# Patient Record
Sex: FEMALE | Race: WHITE | Employment: FULL TIME | URBAN - METROPOLITAN AREA
[De-identification: names, ages, dates, MRNs, and addresses within clinical notes are randomized per-mention and may not be internally consistent; named-entity substitution may affect disease eponyms.]

---

## 2017-01-10 ENCOUNTER — OFFICE VISIT (OUTPATIENT)
Dept: INTERNAL MEDICINE CLINIC | Age: 57
End: 2017-01-10

## 2017-01-10 VITALS
HEIGHT: 67 IN | HEART RATE: 84 BPM | WEIGHT: 127 LBS | SYSTOLIC BLOOD PRESSURE: 96 MMHG | DIASTOLIC BLOOD PRESSURE: 54 MMHG | BODY MASS INDEX: 19.93 KG/M2

## 2017-01-10 DIAGNOSIS — Z71.84 COUNSELING FOR TRAVEL: Primary | ICD-10-CM

## 2017-01-10 PROCEDURE — 99212 OFFICE O/P EST SF 10 MIN: CPT | Performed by: INTERNAL MEDICINE

## 2017-01-10 RX ORDER — CIPROFLOXACIN 500 MG/1
TABLET, FILM COATED ORAL
Qty: 6 TABLET | Refills: 0 | Status: SHIPPED | OUTPATIENT
Start: 2017-01-10 | End: 2017-04-12 | Stop reason: ALTCHOICE

## 2017-04-12 ENCOUNTER — OFFICE VISIT (OUTPATIENT)
Dept: INTERNAL MEDICINE CLINIC | Age: 57
End: 2017-04-12

## 2017-04-12 VITALS
OXYGEN SATURATION: 100 % | BODY MASS INDEX: 20.41 KG/M2 | WEIGHT: 127 LBS | HEIGHT: 66 IN | DIASTOLIC BLOOD PRESSURE: 69 MMHG | HEART RATE: 72 BPM | SYSTOLIC BLOOD PRESSURE: 99 MMHG

## 2017-04-12 DIAGNOSIS — Z12.4 PAP SMEAR FOR CERVICAL CANCER SCREENING: ICD-10-CM

## 2017-04-12 DIAGNOSIS — N63.20 LEFT BREAST LUMP: ICD-10-CM

## 2017-04-12 DIAGNOSIS — Z00.00 ANNUAL PHYSICAL EXAM: ICD-10-CM

## 2017-04-12 DIAGNOSIS — Z11.4 SCREENING FOR HIV WITHOUT PRESENCE OF RISK FACTORS: ICD-10-CM

## 2017-04-12 DIAGNOSIS — Z11.59 NEED FOR HEPATITIS C SCREENING TEST: ICD-10-CM

## 2017-04-12 DIAGNOSIS — Z13.1 SCREENING FOR DIABETES MELLITUS: ICD-10-CM

## 2017-04-12 DIAGNOSIS — Z13.220 LIPID SCREENING: ICD-10-CM

## 2017-04-12 DIAGNOSIS — Z00.00 ANNUAL PHYSICAL EXAM: Primary | ICD-10-CM

## 2017-04-12 DIAGNOSIS — E55.9 VITAMIN D DEFICIENCY: ICD-10-CM

## 2017-04-12 DIAGNOSIS — N39.0 RECURRENT UTI: ICD-10-CM

## 2017-04-12 LAB
ANION GAP SERPL CALCULATED.3IONS-SCNC: 15 MMOL/L (ref 3–16)
BUN BLDV-MCNC: 8 MG/DL (ref 7–20)
CALCIUM SERPL-MCNC: 9.6 MG/DL (ref 8.3–10.6)
CHLORIDE BLD-SCNC: 101 MMOL/L (ref 99–110)
CHOLESTEROL, TOTAL: 212 MG/DL (ref 0–199)
CO2: 27 MMOL/L (ref 21–32)
CREAT SERPL-MCNC: 0.7 MG/DL (ref 0.6–1.1)
GFR AFRICAN AMERICAN: >60
GFR NON-AFRICAN AMERICAN: >60
GLUCOSE BLD-MCNC: 86 MG/DL (ref 70–99)
HDLC SERPL-MCNC: 83 MG/DL (ref 40–60)
HEPATITIS C ANTIBODY INTERPRETATION: NORMAL
LDL CHOLESTEROL CALCULATED: 111 MG/DL
POTASSIUM SERPL-SCNC: 4.8 MMOL/L (ref 3.5–5.1)
SODIUM BLD-SCNC: 143 MMOL/L (ref 136–145)
TRIGL SERPL-MCNC: 90 MG/DL (ref 0–150)
VITAMIN D 25-HYDROXY: 14.9 NG/ML
VLDLC SERPL CALC-MCNC: 18 MG/DL

## 2017-04-12 PROCEDURE — 99396 PREV VISIT EST AGE 40-64: CPT | Performed by: INTERNAL MEDICINE

## 2017-04-13 LAB — HIV-1 AND HIV-2 ANTIBODIES: NORMAL

## 2017-04-18 LAB
HPV COMMENT: NORMAL
HPV TYPE 16: NOT DETECTED
HPV TYPE 18: NOT DETECTED
HPVOH (OTHER TYPES): NOT DETECTED

## 2017-04-20 PROBLEM — E55.9 VITAMIN D DEFICIENCY: Status: ACTIVE | Noted: 2017-04-20

## 2017-04-20 RX ORDER — ERGOCALCIFEROL 1.25 MG/1
50000 CAPSULE ORAL WEEKLY
Qty: 13 CAPSULE | Refills: 0 | Status: SHIPPED | OUTPATIENT
Start: 2017-04-20 | End: 2018-01-16

## 2017-04-24 ENCOUNTER — HOSPITAL ENCOUNTER (OUTPATIENT)
Dept: ULTRASOUND IMAGING | Age: 57
Discharge: HOME | End: 2017-04-24
Attending: INTERNAL MEDICINE | Admitting: INTERNAL MEDICINE

## 2017-04-24 DIAGNOSIS — N63.20 LEFT BREAST LUMP: ICD-10-CM

## 2017-04-24 DIAGNOSIS — N63.0 BREAST LUMP: ICD-10-CM

## 2017-04-24 DIAGNOSIS — N63.0 BREAST LUMP OR MASS: ICD-10-CM

## 2017-05-01 DIAGNOSIS — N63.20 LEFT BREAST LUMP: Primary | ICD-10-CM

## 2017-05-16 ENCOUNTER — HOSPITAL ENCOUNTER (OUTPATIENT)
Dept: OTHER | Age: 57
Discharge: HOME | End: 2017-05-16
Attending: SURGERY | Admitting: SURGERY

## 2017-05-16 ENCOUNTER — OFFICE VISIT (OUTPATIENT)
Dept: SURGERY | Age: 57
End: 2017-05-16

## 2017-05-16 VITALS
DIASTOLIC BLOOD PRESSURE: 60 MMHG | SYSTOLIC BLOOD PRESSURE: 99 MMHG | WEIGHT: 125 LBS | HEART RATE: 79 BPM | HEIGHT: 66 IN | BODY MASS INDEX: 20.09 KG/M2

## 2017-05-16 DIAGNOSIS — N63.20 LEFT BREAST MASS: Primary | ICD-10-CM

## 2017-05-16 DIAGNOSIS — N63.20 LEFT BREAST MASS: ICD-10-CM

## 2017-05-16 PROCEDURE — 99244 OFF/OP CNSLTJ NEW/EST MOD 40: CPT | Performed by: SURGERY

## 2017-08-25 ENCOUNTER — OFFICE VISIT (OUTPATIENT)
Dept: INTERNAL MEDICINE CLINIC | Age: 57
End: 2017-08-25

## 2017-08-25 VITALS
OXYGEN SATURATION: 97 % | SYSTOLIC BLOOD PRESSURE: 89 MMHG | TEMPERATURE: 97.6 F | WEIGHT: 125 LBS | BODY MASS INDEX: 20.18 KG/M2 | HEART RATE: 86 BPM | DIASTOLIC BLOOD PRESSURE: 53 MMHG

## 2017-08-25 DIAGNOSIS — N39.0 RECURRENT UTI: Primary | ICD-10-CM

## 2017-08-25 LAB
BILIRUBIN, POC: NORMAL
BLOOD URINE, POC: NORMAL
CLARITY, POC: NORMAL
COLOR, POC: NORMAL
GLUCOSE URINE, POC: NORMAL
KETONES, POC: NORMAL
LEUKOCYTE EST, POC: NORMAL
NITRITE, POC: NEGATIVE
PH, POC: 6
PROTEIN, POC: NORMAL
SPECIFIC GRAVITY, POC: 1.01
UROBILINOGEN, POC: 0.2

## 2017-08-25 PROCEDURE — 81002 URINALYSIS NONAUTO W/O SCOPE: CPT | Performed by: INTERNAL MEDICINE

## 2017-08-25 PROCEDURE — 99213 OFFICE O/P EST LOW 20 MIN: CPT | Performed by: INTERNAL MEDICINE

## 2017-08-30 LAB — URINE CULTURE, ROUTINE: NORMAL

## 2017-09-04 ENCOUNTER — PATIENT MESSAGE (OUTPATIENT)
Dept: INTERNAL MEDICINE CLINIC | Age: 57
End: 2017-09-04

## 2017-09-04 DIAGNOSIS — R30.0 DYSURIA: Primary | ICD-10-CM

## 2017-09-07 LAB — URINE CULTURE, ROUTINE: NORMAL

## 2017-11-21 ENCOUNTER — HOSPITAL ENCOUNTER (OUTPATIENT)
Dept: MAMMOGRAPHY | Age: 57
Discharge: HOME | End: 2017-11-21
Admitting: SURGERY

## 2017-11-21 DIAGNOSIS — N63.20 LEFT BREAST MASS: ICD-10-CM

## 2017-12-06 ENCOUNTER — OFFICE VISIT (OUTPATIENT)
Dept: SURGERY | Age: 57
End: 2017-12-06

## 2017-12-06 ENCOUNTER — HOSPITAL ENCOUNTER (OUTPATIENT)
Dept: ULTRASOUND IMAGING | Age: 57
Discharge: HOME | End: 2017-12-06
Attending: SURGERY | Admitting: SURGERY

## 2017-12-06 VITALS
BODY MASS INDEX: 19.78 KG/M2 | HEART RATE: 70 BPM | SYSTOLIC BLOOD PRESSURE: 104 MMHG | HEIGHT: 67 IN | WEIGHT: 126 LBS | DIASTOLIC BLOOD PRESSURE: 69 MMHG

## 2017-12-06 DIAGNOSIS — N63.20 BREAST MASS, LEFT: ICD-10-CM

## 2017-12-06 DIAGNOSIS — N60.19 FIBROCYSTIC BREAST DISEASE, UNSPECIFIED LATERALITY: Primary | ICD-10-CM

## 2017-12-06 PROCEDURE — 99213 OFFICE O/P EST LOW 20 MIN: CPT | Performed by: SURGERY

## 2017-12-06 RX ORDER — TRIMETHOPRIM 100 MG/1
100 TABLET ORAL PRN
COMMUNITY
Start: 2017-10-09 | End: 2020-08-19

## 2017-12-12 ENCOUNTER — E-VISIT (OUTPATIENT)
Dept: INTERNAL MEDICINE CLINIC | Age: 57
End: 2017-12-12

## 2017-12-12 DIAGNOSIS — J01.00 ACUTE NON-RECURRENT MAXILLARY SINUSITIS: Primary | ICD-10-CM

## 2017-12-12 PROCEDURE — 99444 PR PHYSICIAN ONLINE EVALUATION & MANAGEMENT SERVICE: CPT | Performed by: INTERNAL MEDICINE

## 2017-12-12 RX ORDER — AMOXICILLIN AND CLAVULANATE POTASSIUM 875; 125 MG/1; MG/1
1 TABLET, FILM COATED ORAL 2 TIMES DAILY WITH MEALS
Qty: 20 TABLET | Refills: 0 | Status: SHIPPED | OUTPATIENT
Start: 2017-12-12 | End: 2017-12-22

## 2017-12-12 ASSESSMENT — LIFESTYLE VARIABLES: SMOKING_STATUS: NO

## 2017-12-14 NOTE — PROGRESS NOTES
HPI:as per patient provided history  Exam: N/A (electronic visit)  Assessment/Plan:  Diagnoses and all orders for this visit:    Acute non-recurrent maxillary sinusitis    Other orders  -     amoxicillin-clavulanate (AUGMENTIN) 875-125 MG per tablet;  Take 1 tablet by mouth 2 times daily (with meals) for 10 days

## 2018-01-16 ENCOUNTER — OFFICE VISIT (OUTPATIENT)
Dept: INTERNAL MEDICINE CLINIC | Age: 58
End: 2018-01-16

## 2018-01-16 VITALS
SYSTOLIC BLOOD PRESSURE: 88 MMHG | WEIGHT: 127 LBS | DIASTOLIC BLOOD PRESSURE: 52 MMHG | BODY MASS INDEX: 20.19 KG/M2 | HEART RATE: 76 BPM

## 2018-01-16 DIAGNOSIS — N30.90 RECURRENT CYSTITIS: Primary | ICD-10-CM

## 2018-01-16 DIAGNOSIS — E55.9 VITAMIN D DEFICIENCY: ICD-10-CM

## 2018-01-16 DIAGNOSIS — Z71.84 COUNSELING FOR TRAVEL: ICD-10-CM

## 2018-01-16 PROBLEM — N60.12 FIBROCYSTIC DISEASE OF LEFT BREAST: Status: ACTIVE | Noted: 2017-05-01

## 2018-01-16 PROCEDURE — 99213 OFFICE O/P EST LOW 20 MIN: CPT | Performed by: INTERNAL MEDICINE

## 2018-01-16 RX ORDER — CIPROFLOXACIN 500 MG/1
TABLET, FILM COATED ORAL
Qty: 6 TABLET | Refills: 0 | Status: SHIPPED | OUTPATIENT
Start: 2018-01-16 | End: 2019-01-28

## 2018-01-16 RX ORDER — MULTIVIT-MIN/IRON/FOLIC ACID/K 18-600-40
2000 CAPSULE ORAL DAILY
Qty: 30 CAPSULE | Refills: 11 | COMMUNITY
Start: 2018-01-16

## 2018-01-16 ASSESSMENT — PATIENT HEALTH QUESTIONNAIRE - PHQ9
2. FEELING DOWN, DEPRESSED OR HOPELESS: 0
1. LITTLE INTEREST OR PLEASURE IN DOING THINGS: 0
SUM OF ALL RESPONSES TO PHQ9 QUESTIONS 1 & 2: 0
SUM OF ALL RESPONSES TO PHQ QUESTIONS 1-9: 0

## 2018-01-16 NOTE — PROGRESS NOTES
[address] Internal Medicine  Patient Encounter   Ignacio Uribe MD    2018    Tiffanie Bernardo  :1960       Assessment/Plan:  Recurrent cystitis  Negative eval with Dr. Schuler Fitting per patient. . Will request CT and cysto. Will continue post-coital trimethoprim    Counseling for travel  [facility] work mission trip. Due for Tdap and oral typhoid rx given. Cipro rx for travel  No malaria prophylaxis required. Vitamin D deficiency  Now on oral vitamin D 2000 units daily. Labs with physical next time. Other orders  -     typhoid vaccine (VIVOTIF) delayed release capsule; Take 1 tablet by mouth every other day until gone. Should be taken 1 hour before meal with cold or lukewarm beverage.  -     ciprofloxacin (CIPRO) 500 MG tablet; Take 1 tablet bid x 3 days prn traveler's diarrhea. -     Cholecalciferol (VITAMIN D) 2000 units CAPS capsule; Take 2,000 Units by mouth daily  -     Tdap (ADACEL) 5-2-15.5 LF-MCG/0.5 injection; Inject 0.5 mLs into the muscle once for 1 dose Please call or fax office with date administered. Discussed medications with patient who voiced understanding of their use and indications. All questions answered. Return in about 4 months (around 2018) for CPE/PAP. (didn't have HPV with last pap)     Medical assistant triage:  Chief Complaint   Patient presents with    Check-Up     HPI:   Patient presents for follow-up of   1. Recurrent cystitis    2. Counseling for travel    3. Vitamin D deficiency         Getting ready to go to [facility]. Will be going in 2 weeks for 9 days, [place] altitude so no malaria med needed. 6th year going. Needs typhoid updated and wants to check on tetanus status. Doesn't need malaria med there due to altitude. Saw Dr. Schuler Fitting. Now on post-coital trimethoprim      ROS  As per HPI.     Past Medical History:   Diagnosis Date    Anxiety disorder     with panic attacks, Dr. Audrey Arnold    Cervical disc disease     MRI  with disc bulge C5-6    Internal hemorrhoids     Microhematuria 11/27/2013    Normal cysto/CT 2017, and patient reports negative 11/13.  Post-menopause     age 48    Recurrent cystitis 4/12/2017    Post-menopausal; Manage prophylactically with periurethral estrogen. Dr. Belle Schultz added trimethoprim post-coital 2017    Seasonal allergies     May-July    Sigmoid diverticulosis     scant, noted on colonoscopy 11/14    Vitamin D deficiency 4/20/2017     Prior to Visit Medications    Medication Sig Taking? Authorizing Provider   typhoid vaccine (VIVOTIF) delayed release capsule Take 1 tablet by mouth every other day until gone. Should be taken 1 hour before meal with cold or lukewarm beverage. Yes Lennox Highland, MD   ciprofloxacin (CIPRO) 500 MG tablet Take 1 tablet bid x 3 days prn traveler's diarrhea. Yes Lennox Highland, MD   Cholecalciferol (VITAMIN D) 2000 units CAPS capsule Take 2,000 Units by mouth daily Yes Lennox Highland, MD   Tdap (ADACEL) 5-2-15.5 LF-MCG/0.5 injection Inject 0.5 mLs into the muscle once for 1 dose Please call or fax office with date administered. Yes Lennox Highland, MD   trimethoprim (TRIMPEX) 100 MG tablet Take 100 mg by mouth as needed Yes Historical Provider, MD   MULTIPLE MINERALS PO Take by mouth Yes Historical Provider, MD   estradiol (ESTRACE VAGINAL) 0.1 MG/GM vaginal cream Apply pea-size amount to periurethral area daily for 2 weeks and then 2-3x/week. Yes Lennox Highland, MD   cetirizine (ZYRTEC) 10 MG tablet Take 10 mg by mouth daily as needed for Allergies. Yes Historical Provider, MD   PARoxetine (PAXIL) 40 MG tablet Take 40 mg by mouth every morning. Yes Historical Provider, MD   ALPRAZolam (XANAX XR) 1 MG XR tablet Take 1 mg by mouth nightly. Yes Historical Provider, MD   ALPRAZolam Dwain Flores) 0.5 MG tablet Take 0.5 mg by mouth as needed.  Yes Historical Provider, MD       OBJECTIVE:  BP Readings from Last 3 Encounters:   01/16/18 (!) 88/52   12/06/17 104/69   08/25/17 (!) 89/53      Wt Readings from Last 3

## 2019-01-29 ENCOUNTER — OFFICE VISIT (OUTPATIENT)
Dept: INTERNAL MEDICINE CLINIC | Age: 59
End: 2019-01-29
Payer: COMMERCIAL

## 2019-01-29 VITALS
HEART RATE: 78 BPM | DIASTOLIC BLOOD PRESSURE: 74 MMHG | WEIGHT: 124 LBS | BODY MASS INDEX: 19.93 KG/M2 | HEIGHT: 66 IN | OXYGEN SATURATION: 98 % | SYSTOLIC BLOOD PRESSURE: 114 MMHG

## 2019-01-29 DIAGNOSIS — Z00.00 ANNUAL PHYSICAL EXAM: Primary | ICD-10-CM

## 2019-01-29 DIAGNOSIS — Z12.39 SCREENING FOR BREAST CANCER: ICD-10-CM

## 2019-01-29 DIAGNOSIS — Z13.1 SCREENING FOR DIABETES MELLITUS: ICD-10-CM

## 2019-01-29 DIAGNOSIS — N60.12 FIBROCYSTIC DISEASE OF LEFT BREAST: ICD-10-CM

## 2019-01-29 DIAGNOSIS — Z78.0 POST-MENOPAUSAL: ICD-10-CM

## 2019-01-29 DIAGNOSIS — E55.9 VITAMIN D DEFICIENCY: ICD-10-CM

## 2019-01-29 DIAGNOSIS — Z13.220 LIPID SCREENING: ICD-10-CM

## 2019-01-29 PROCEDURE — G8484 FLU IMMUNIZE NO ADMIN: HCPCS | Performed by: INTERNAL MEDICINE

## 2019-01-29 PROCEDURE — 90715 TDAP VACCINE 7 YRS/> IM: CPT | Performed by: INTERNAL MEDICINE

## 2019-01-29 PROCEDURE — 99396 PREV VISIT EST AGE 40-64: CPT | Performed by: INTERNAL MEDICINE

## 2019-01-29 PROCEDURE — 90471 IMMUNIZATION ADMIN: CPT | Performed by: INTERNAL MEDICINE

## 2019-01-29 RX ORDER — CIPROFLOXACIN 500 MG/1
TABLET, FILM COATED ORAL
Qty: 6 TABLET | Refills: 0 | Status: SHIPPED | OUTPATIENT
Start: 2019-01-29 | End: 2020-01-29

## 2019-01-29 ASSESSMENT — PATIENT HEALTH QUESTIONNAIRE - PHQ9
SUM OF ALL RESPONSES TO PHQ QUESTIONS 1-9: 0
SUM OF ALL RESPONSES TO PHQ9 QUESTIONS 1 & 2: 0
SUM OF ALL RESPONSES TO PHQ QUESTIONS 1-9: 0
2. FEELING DOWN, DEPRESSED OR HOPELESS: 0
1. LITTLE INTEREST OR PLEASURE IN DOING THINGS: 0

## 2019-03-06 ENCOUNTER — HOSPITAL ENCOUNTER (OUTPATIENT)
Dept: MAMMOGRAPHY | Age: 59
Discharge: HOME | End: 2019-03-06
Payer: COMMERCIAL

## 2019-03-06 ENCOUNTER — HOSPITAL ENCOUNTER (OUTPATIENT)
Dept: GENERAL RADIOLOGY | Age: 59
Discharge: HOME | End: 2019-03-06
Payer: COMMERCIAL

## 2019-03-06 DIAGNOSIS — N60.12 FIBROCYSTIC DISEASE OF LEFT BREAST: ICD-10-CM

## 2019-03-06 DIAGNOSIS — Z78.0 POST-MENOPAUSAL: ICD-10-CM

## 2019-03-06 DIAGNOSIS — Z12.39 SCREENING FOR BREAST CANCER: ICD-10-CM

## 2019-03-06 PROCEDURE — 77080 DXA BONE DENSITY AXIAL: CPT

## 2019-03-06 PROCEDURE — 77063 BREAST TOMOSYNTHESIS BI: CPT

## 2019-03-07 PROBLEM — M85.89 OSTEOPENIA OF MULTIPLE SITES: Status: ACTIVE | Noted: 2019-03-07

## 2020-01-09 ENCOUNTER — E-VISIT (OUTPATIENT)
Dept: INTERNAL MEDICINE CLINIC | Age: 60
End: 2020-01-09

## 2020-01-09 PROCEDURE — 99421 OL DIG E/M SVC 5-10 MIN: CPT | Performed by: INTERNAL MEDICINE

## 2020-01-09 RX ORDER — AZITHROMYCIN 250 MG/1
500 TABLET, FILM COATED ORAL DAILY
Qty: 6 TABLET | Refills: 0 | Status: SHIPPED | OUTPATIENT
Start: 2020-01-09 | End: 2020-01-12

## 2020-01-29 ENCOUNTER — E-VISIT (OUTPATIENT)
Dept: INTERNAL MEDICINE CLINIC | Age: 60
End: 2020-01-29
Payer: COMMERCIAL

## 2020-01-29 PROCEDURE — 99441 PR PHYS/QHP TELEPHONE EVALUATION 5-10 MIN: CPT | Performed by: INTERNAL MEDICINE

## 2020-01-29 RX ORDER — AMOXICILLIN AND CLAVULANATE POTASSIUM 875; 125 MG/1; MG/1
1 TABLET, FILM COATED ORAL 2 TIMES DAILY
Qty: 14 TABLET | Refills: 0 | Status: SHIPPED | OUTPATIENT
Start: 2020-01-29 | End: 2020-02-05

## 2020-01-29 ASSESSMENT — LIFESTYLE VARIABLES
SMOKING_STATUS: NO, I'M A FORMER SMOKER
PACKS_PER_DAY: 1
SMOKING_YEARS: 20

## 2020-03-09 ENCOUNTER — HOSPITAL ENCOUNTER (OUTPATIENT)
Dept: MAMMOGRAPHY | Age: 60
Discharge: HOME | End: 2020-03-09
Payer: COMMERCIAL

## 2020-03-09 PROCEDURE — 77063 BREAST TOMOSYNTHESIS BI: CPT

## 2020-08-19 ENCOUNTER — OFFICE VISIT (OUTPATIENT)
Dept: INTERNAL MEDICINE CLINIC | Age: 60
End: 2020-08-19
Payer: COMMERCIAL

## 2020-08-19 VITALS
HEART RATE: 75 BPM | TEMPERATURE: 98 F | SYSTOLIC BLOOD PRESSURE: 98 MMHG | HEIGHT: 66 IN | BODY MASS INDEX: 20.57 KG/M2 | DIASTOLIC BLOOD PRESSURE: 60 MMHG | OXYGEN SATURATION: 97 % | WEIGHT: 128 LBS

## 2020-08-19 PROCEDURE — 99396 PREV VISIT EST AGE 40-64: CPT | Performed by: INTERNAL MEDICINE

## 2020-08-19 RX ORDER — NITROFURANTOIN MACROCRYSTALS 100 MG/1
100 CAPSULE ORAL NIGHTLY
COMMUNITY

## 2020-08-19 SDOH — HEALTH STABILITY: MENTAL HEALTH: HOW MANY STANDARD DRINKS CONTAINING ALCOHOL DO YOU HAVE ON A TYPICAL DAY?: 1 OR 2

## 2020-08-19 SDOH — HEALTH STABILITY: MENTAL HEALTH: HOW OFTEN DO YOU HAVE A DRINK CONTAINING ALCOHOL?: 2-3 TIMES A WEEK

## 2020-08-19 ASSESSMENT — PATIENT HEALTH QUESTIONNAIRE - PHQ9
SUM OF ALL RESPONSES TO PHQ9 QUESTIONS 1 & 2: 0
1. LITTLE INTEREST OR PLEASURE IN DOING THINGS: 0
SUM OF ALL RESPONSES TO PHQ QUESTIONS 1-9: 0
SUM OF ALL RESPONSES TO PHQ QUESTIONS 1-9: 0
2. FEELING DOWN, DEPRESSED OR HOPELESS: 0

## 2020-08-19 ASSESSMENT — ENCOUNTER SYMPTOMS
RESPIRATORY NEGATIVE: 1
GASTROINTESTINAL NEGATIVE: 1

## 2020-08-19 NOTE — PROGRESS NOTES
Annual Physical Exam      Neva Conroy  : 1960    Date of Service: 2020    Chief Complaint:Jessie Posada is a 61 y.o. female who presents for complete physical examination. HPI:  Had cysto with Dr. Dario Carter. Changed to macrodantin due to more uti and inflammation in bladder. Also added miralax. Exercise: walking 5 days/week, pilates when open  Diet: \"regular\"    No LMP recorded.  Patient is postmenopausal.   Sexual activity: single partner, contraception - post menopausal status  She has no breast pain or new or enlarging lumps on self exam, no vaginal bleeding, no hot flashes    Patient Care Team:  Sabino Guerrero MD as PCP - General (Internal Medicine)  Sabino Guerrero MD as PCP - [facility]  Pat Benoit MD as Consulting Physician (Gastroenterology)  Pee Neil MD as Consulting Physician (Urology)  ABEL Brooks - CNP as Advanced Practice Nurse (Urology)    Health Maintenance   Topic Date Due    Colon cancer screen colonoscopy  2019    Shingles Vaccine (1 of 2) 2021 (Originally 2010)    Breast cancer screen  2021    Cervical cancer screen  2022    Lipid screen  2024    DTaP/Tdap/Td vaccine (3 - Td) 2029    DEXA (modify frequency per FRAX score)  Completed    Hepatitis C screen  Completed    HIV screen  Completed    Hepatitis A vaccine  Aged Out    Hepatitis B vaccine  Aged Out    Hib vaccine  Aged Out    Meningococcal (ACWY) vaccine  Aged Out    Pneumococcal 0-64 years Vaccine  Aged [employer] History   Administered Date(s) Administered    Hepatitis A 2012, 2014    Polio IPV (IPOL) 2012    Tdap (Boostrix, Adacel) 2008, 2019    Typhoid Live, Oral (Vivotif) 2012, 2018       Allergies   Allergen Reactions    Influenza Vaccines Other (See Comments)     Fever and swollen axillary nodes       Outpatient Medications Marked as Taking for the 8/19/20 encounter (Office Visit) with Viet Riddle MD   Medication Sig Dispense Refill    nitrofurantoin (MACRODANTIN) 100 MG capsule Take 100 mg by mouth nightly      Polyethylene Glycol 3350 (MIRALAX PO) Take by mouth every morning      Cholecalciferol (VITAMIN D) 2000 units CAPS capsule Take 2,000 Units by mouth daily 30 capsule 11    estradiol (ESTRACE VAGINAL) 0.1 MG/GM vaginal cream Apply pea-size amount to periurethral area daily for 2 weeks and then 2-3x/week. 1 Tube 1    PARoxetine (PAXIL) 40 MG tablet Take 40 mg by mouth every morning.  ALPRAZolam (XANAX XR) 1 MG XR tablet Take 1 mg by mouth nightly. Past Medical History:   Diagnosis Date    Anxiety disorder     with panic attacks, Dr. Mandi Mclean Cervical disc disease     MRI 5/09 with disc bulge C5-6    Internal hemorrhoids     Microhematuria 11/27/2013    Normal cysto/CT 2017, and patient reports negative 11/13.  Osteopenia of multiple sites 3/7/2019    Post-menopause     age 48    Recurrent cystitis 4/12/2017    Post-menopausal; Manage prophylactically with periurethral estrogen. Dr. John Plascencia added trimethoprim post-coital 2017    Seasonal allergies     May-July    Sigmoid diverticulosis     scant, noted on colonoscopy 11/14    Vitamin D deficiency 4/20/2017     Past Surgical History:   Procedure Laterality Date    BREAST BIOPSY Right 10/2015    benign stroaml fiborsis and ductal hyperplasia.  No atypia    PELVIC LAPAROSCOPY  1990    infertility    WISDOM TOOTH EXTRACTION      18's     Family History   Problem Relation Age of Onset    Colon Cancer Father 58        Rectal     High Cholesterol Father     Colon Polyps Brother     Colon Polyps Sister     Thyroid Disease Mother     Atrial Fibrillation Mother         subsequent stroke    Other Other         colon polyps    Coronary Art Dis Other 79        MI (\"anuerysm in heart\")    Breast Cancer Maternal Grandmother 48    Depression Other      Social History Tobacco Use    Smoking status: Former Smoker     Packs/day: 0.50     Years: 28.00     Pack years: 14.00     Types: Cigarettes     Last attempt to quit: 6/1/2010     Years since quitting: 10.2    Smokeless tobacco: Never Used   Substance Use Topics    Alcohol use: Yes     Frequency: 2-3 times a week     Drinks per session: 1 or 2     Comment: 1 drink/month    Drug use: No       Review of Systems   Respiratory: Negative. Cardiovascular: Negative. Gastrointestinal: Negative. Endocrine: Negative. Musculoskeletal: Negative. Neurological: Negative. Hematological: Negative. Wt Readings from Last 5 Encounters:   08/19/20 128 lb (58.1 kg)   01/29/19 124 lb (56.2 kg)   01/16/18 127 lb (57.6 kg)   12/06/17 126 lb (57.2 kg)   08/25/17 125 lb (56.7 kg)     BP Readings from Last 3 Encounters:   08/19/20 98/60   01/29/19 114/74   01/16/18 (!) 88/52       Physical Exam:   Vitals:    08/19/20 1337   BP: 98/60   Pulse: 75   Temp: 98 °F (36.7 °C)   TempSrc: Temporal   SpO2: 97%   Weight: 128 lb (58.1 kg)   Height: 5' 5.5\" (1.664 m)     Body mass index is 20.98 kg/m². Physical Exam  Constitutional:       Appearance: She is well-developed. HENT:      Head: Normocephalic and atraumatic. Right Ear: Tympanic membrane and ear canal normal.      Left Ear: Tympanic membrane and ear canal normal.   Eyes:      Conjunctiva/sclera: Conjunctivae normal.      Pupils: Pupils are equal, round, and reactive to light. Neck:      Musculoskeletal: Normal range of motion and neck supple. Thyroid: No thyromegaly. Cardiovascular:      Rate and Rhythm: Normal rate and regular rhythm. Pulmonary:      Effort: Pulmonary effort is normal.      Breath sounds: Normal breath sounds. Chest:      Breasts:         Right: No mass, nipple discharge, skin change or tenderness. Left: Mass (1 x 1.5 cm oval soft tissue lesion in between  1-2 oclock (unchanged)) present.  No inverted nipple, nipple discharge, skin change or tenderness. Abdominal:      General: Bowel sounds are normal.      Palpations: Abdomen is soft. There is no mass. Tenderness: There is no abdominal tenderness. Musculoskeletal:      Right lower leg: No edema. Left lower leg: No edema. Lymphadenopathy:      Cervical: No cervical adenopathy. Skin:     General: Skin is warm and dry. Coloration: Skin is not pale. Neurological:      General: No focal deficit present. Mental Status: She is alert. Psychiatric:         Mood and Affect: Mood normal.       Glucose:  Glucose (mg/dL)   Date Value   04/12/2017 86     Lipids  Lab Results   Component Value Date    CHOL 212 (H) 04/12/2017    TRIG 90 04/12/2017    HDL 83 (H) 01/29/2019    LDLCALC 116 (H) 01/29/2019       Assessment/Plan:  Annual PE/Wellness exam  Discussed age appropriate preventive care including healthy diet, daily exercise, immunizations and age & gender guidedscreening tests. Living will: yes,   copy requested  Screening labs  Will hold of on shingles. Discussed COVID vaccine and concerns due to flu reaction usually lasting 4 days. Thinks she would like to try COVID vaccine when available. unless clear contraindication. Screening for diabetes mellitus  -     Basic Metabolic Panel, Fasting; Future  -     Hemoglobin A1C; Future    Lipid screening  -     Lipid, Fasting; Future    Return in about 1 year (around 8/19/2021) for CPE.   Ignacio Coreas MD

## 2020-08-19 NOTE — PATIENT INSTRUCTIONS
Recommendations for optimal health:  Be sure you are exercising at least 30 minutes  or 10,000 steps daily. Ideally you should try to get a mix of cardio and strength exercises. Work on [employer] For more detailed information, visit Nutrition Source web site- knowledge for healthy eating from [address] [place] [address]. [facility]      If your are using supplements, look for \"USP verified\" on the label. Helps to assure they are good quality. Vitamin D 800 units daily. Calcium intake - try for 800-1200 mg of calcium in combination of diet and supplements. You can read on this in much more detail on nutrition1010datae. org    8 Nutrition Tips for Eating Right:  1. Choose good carbs, not no carbs. Whole grains are your best bet. 2. Pay attention to the protein package. Fish, poultry, nuts, and beans are the best choices. 3. Choose foods with healthy fats, limit foods high in saturated fat, and avoid foods with trans fat. Plant oils, nuts, and fish are the healthiest sources. 4. Choose a fiber-filled diet, rich in whole grains, vegetables, and fruits. 5. Eat more vegetables and fruits. Go for color and variety--dark green, yellow, orange, and red. 6. Calcium is important. But milk isnt the only, or even best, source. 7. Water is best to quench your thirst. Skip the sugary drinks, and go easy on the milk and juice. 8. Eating less salt is good for everyones health. Choose more fresh foods and fewer processed foods. Aim for 2-3 cups of vegetables daily and 1 1/2-2 cups of fruits daily.

## 2020-09-23 DIAGNOSIS — Z13.220 LIPID SCREENING: ICD-10-CM

## 2020-09-23 DIAGNOSIS — Z13.1 SCREENING FOR DIABETES MELLITUS: ICD-10-CM

## 2020-09-23 LAB
ANION GAP SERPL CALCULATED.3IONS-SCNC: 12 MMOL/L (ref 3–16)
BUN BLDV-MCNC: 7 MG/DL (ref 7–20)
CALCIUM SERPL-MCNC: 10.2 MG/DL (ref 8.3–10.6)
CHLORIDE BLD-SCNC: 99 MMOL/L (ref 99–110)
CHOLESTEROL, FASTING: 219 MG/DL (ref 0–199)
CO2: 27 MMOL/L (ref 21–32)
CREAT SERPL-MCNC: 0.7 MG/DL (ref 0.6–1.2)
ESTIMATED AVERAGE GLUCOSE: 111.2 MG/DL
GFR AFRICAN AMERICAN: >60
GFR NON-AFRICAN AMERICAN: >60
GLUCOSE FASTING: 93 MG/DL (ref 70–99)
HBA1C MFR BLD: 5.5 %
HDLC SERPL-MCNC: 76 MG/DL (ref 40–60)
LDL CHOLESTEROL CALCULATED: 125 MG/DL
POTASSIUM SERPL-SCNC: 4.7 MMOL/L (ref 3.5–5.1)
SODIUM BLD-SCNC: 138 MMOL/L (ref 136–145)
TRIGLYCERIDE, FASTING: 89 MG/DL (ref 0–150)
VLDLC SERPL CALC-MCNC: 18 MG/DL

## 2021-03-15 ENCOUNTER — TELEPHONE (OUTPATIENT)
Dept: INTERNAL MEDICINE CLINIC | Age: 61
End: 2021-03-15

## 2021-03-15 DIAGNOSIS — Z13.1 SCREENING FOR DIABETES MELLITUS: ICD-10-CM

## 2021-03-15 DIAGNOSIS — M85.89 OSTEOPENIA OF MULTIPLE SITES: ICD-10-CM

## 2021-03-15 DIAGNOSIS — Z13.220 LIPID SCREENING: Primary | ICD-10-CM

## 2021-03-16 ENCOUNTER — HOSPITAL ENCOUNTER (OUTPATIENT)
Dept: MAMMOGRAPHY | Age: 61
Discharge: HOME | End: 2021-03-16
Payer: COMMERCIAL

## 2021-03-16 DIAGNOSIS — Z12.31 VISIT FOR SCREENING MAMMOGRAM: ICD-10-CM

## 2021-03-16 PROCEDURE — 77063 BREAST TOMOSYNTHESIS BI: CPT

## 2021-08-27 ENCOUNTER — OFFICE VISIT (OUTPATIENT)
Dept: INTERNAL MEDICINE CLINIC | Age: 61
End: 2021-08-27
Payer: COMMERCIAL

## 2021-08-27 VITALS
TEMPERATURE: 97 F | WEIGHT: 125 LBS | DIASTOLIC BLOOD PRESSURE: 64 MMHG | HEIGHT: 66 IN | SYSTOLIC BLOOD PRESSURE: 102 MMHG | OXYGEN SATURATION: 98 % | HEART RATE: 79 BPM | BODY MASS INDEX: 20.09 KG/M2

## 2021-08-27 DIAGNOSIS — L98.9 SKIN LESION OF BACK: ICD-10-CM

## 2021-08-27 DIAGNOSIS — Z78.0 ENCOUNTER FOR OSTEOPOROSIS SCREENING IN ASYMPTOMATIC POSTMENOPAUSAL PATIENT: Primary | ICD-10-CM

## 2021-08-27 DIAGNOSIS — Z13.820 ENCOUNTER FOR OSTEOPOROSIS SCREENING IN ASYMPTOMATIC POSTMENOPAUSAL PATIENT: Primary | ICD-10-CM

## 2021-08-27 PROCEDURE — 90471 IMMUNIZATION ADMIN: CPT | Performed by: INTERNAL MEDICINE

## 2021-08-27 PROCEDURE — 90750 HZV VACC RECOMBINANT IM: CPT | Performed by: INTERNAL MEDICINE

## 2021-08-27 PROCEDURE — 99396 PREV VISIT EST AGE 40-64: CPT | Performed by: INTERNAL MEDICINE

## 2021-08-27 ASSESSMENT — PATIENT HEALTH QUESTIONNAIRE - PHQ9
SUM OF ALL RESPONSES TO PHQ9 QUESTIONS 1 & 2: 0
2. FEELING DOWN, DEPRESSED OR HOPELESS: 0
SUM OF ALL RESPONSES TO PHQ QUESTIONS 1-9: 0
1. LITTLE INTEREST OR PLEASURE IN DOING THINGS: 0
SUM OF ALL RESPONSES TO PHQ QUESTIONS 1-9: 0
SUM OF ALL RESPONSES TO PHQ QUESTIONS 1-9: 0
DEPRESSION UNABLE TO ASSESS: FUNCTIONAL CAPACITY MOTIVATION LIMITS ACCURACY

## 2021-08-27 ASSESSMENT — ENCOUNTER SYMPTOMS
RESPIRATORY NEGATIVE: 1
EYES NEGATIVE: 1
GASTROINTESTINAL NEGATIVE: 1

## 2021-08-27 NOTE — PROGRESS NOTES
ASSESSMENT/PLAN:   Annual physical/preventive evaluation  Discussed age appropriate preventive care including healthy diet, daily exercise, immunizations and age & gender guided screening tests. CRISTINA in next year  Shingrix #1 today    Skin lesion of back  Concern for dysplastic nevi. Refer to derm. Return for shingrix #2 in 2-6 mo. Alexis Liang (:  1960) is a 64 y.o. female, here for annual physical.    Did go and get the covid shot even though had been ambivalent. UTI under control. Daily macrodantin and estradiol cream twice weekly. Infertility treatments. needs ovarian cancer screen?     Exercise: at least 4 days/week, variety  Diet: overall healthy    Health Maintenance   Topic Date Due    Shingles Vaccine (2 of 2) 10/22/2021    Breast cancer screen  2022    Cervical cancer screen  2022    Colon cancer screen colonoscopy  2024    Lipid screen  2026    DTaP/Tdap/Td vaccine (3 - Td or Tdap) 2029    DEXA (modify frequency per FRAX score)  Completed    COVID-19 Vaccine  Completed    Hepatitis C screen  Completed    HIV screen  Completed    Hepatitis A vaccine  Aged Out    Hepatitis B vaccine  Aged Out    Hib vaccine  Aged Out    Meningococcal (ACWY) vaccine  Aged Out    Pneumococcal 0-64 years Vaccine  Aged [employer] History   Administered Date(s) Administered    COVID-19, Moderna, PF, 100mcg/0.5mL 2021, 02/15/2021    Hepatitis A 2012, 2014    Polio IPV (IPOL) 2012    Tdap (Boostrix, Adacel) 2008, 2019    Typhoid Live, Oral (Vivotif) 2012, 2018    Zoster Recombinant (Shingrix) 2021       Patient Active Problem List   Diagnosis    Anxiety disorder    Microhematuria    Seasonal allergies    Hot flashes, menopausal    Recurrent cystitis    Vitamin D deficiency    Fibrocystic disease of left breast    Osteopenia of multiple sites     Review of Systems   Constitutional: Negative. HENT: Negative. Eyes: Negative. Respiratory: Negative. Cardiovascular: Negative. Gastrointestinal: Negative. Genitourinary: Negative. Skin: Negative. Neurological: Negative. Psychiatric/Behavioral: Negative. Outpatient Medications Marked as Taking for the 8/27/21 encounter (Office Visit) with Paulie Lim MD   Medication Sig Dispense Refill    nitrofurantoin (MACRODANTIN) 100 MG capsule Take 100 mg by mouth nightly       Polyethylene Glycol 3350 (MIRALAX PO) Take by mouth every morning      Cholecalciferol (VITAMIN D) 2000 units CAPS capsule Take 2,000 Units by mouth daily 30 capsule 11    MULTIPLE MINERALS PO Take by mouth      estradiol (ESTRACE VAGINAL) 0.1 MG/GM vaginal cream Apply pea-size amount to periurethral area daily for 2 weeks and then 2-3x/week. 1 Tube 1    cetirizine (ZYRTEC) 10 MG tablet Take 10 mg by mouth daily as needed for Allergies.  PARoxetine (PAXIL) 40 MG tablet Take 40 mg by mouth every morning.  ALPRAZolam (XANAX XR) 1 MG XR tablet Take 1 mg by mouth nightly. Allergies   Allergen Reactions    Influenza Vaccines Other (See Comments)     Fever and swollen axillary nodes       Past Medical History:   Diagnosis Date    Anxiety disorder     with panic attacks, Dr. Dion Chahal Cervical disc disease     MRI 5/09 with disc bulge C5-6    Internal hemorrhoids     Microhematuria 11/27/2013    Normal cysto/CT 2017, and patient reports negative 11/13.  Osteopenia of multiple sites 3/7/2019    Post-menopause     age 48    Recurrent cystitis 4/12/2017    Post-menopausal; Manage prophylactically with periurethral estrogen.  Dr. Kay Sox added trimethoprim post-coital 2017    Seasonal allergies     May-July    Sigmoid diverticulosis     scant, noted on colonoscopy 11/14    Vitamin D deficiency 4/20/2017       Past Surgical History:   Procedure Laterality Date    BREAST BIOPSY Right 10/2015    benign stroaml fiborsis and Brother     Colon Polyps Sister     Thyroid Disease Mother     Atrial Fibrillation Mother         subsequent stroke    Other Other         colon polyps    Coronary Art Dis Other 79        MI (\"anuerysm in heart\")    Breast Cancer Maternal Grandmother 48    Depression Other          Vitals:    08/27/21 1006   BP: 102/64   Pulse: 79   Temp: 97 °F (36.1 °C)   SpO2: 98%   Weight: 125 lb (56.7 kg)   Height: 5' 5.5\" (1.664 m)     Estimated body mass index is 20.48 kg/m² as calculated from the following:    Height as of this encounter: 5' 5.5\" (1.664 m). Weight as of this encounter: 125 lb (56.7 kg). Physical Exam  Constitutional:       Appearance: She is well-developed. HENT:      Head: Normocephalic and atraumatic. Right Ear: Tympanic membrane and ear canal normal.      Left Ear: Tympanic membrane and ear canal normal.   Eyes:      Conjunctiva/sclera: Conjunctivae normal.      Pupils: Pupils are equal, round, and reactive to light. Neck:      Thyroid: No thyromegaly. Cardiovascular:      Rate and Rhythm: Normal rate and regular rhythm. Pulmonary:      Effort: Pulmonary effort is normal.      Breath sounds: Normal breath sounds. Abdominal:      General: Bowel sounds are normal.      Palpations: Abdomen is soft. There is no mass. Tenderness: There is no abdominal tenderness. Musculoskeletal:      Cervical back: Normal range of motion and neck supple. Right lower leg: No edema. Left lower leg: No edema. Lymphadenopathy:      Cervical: No cervical adenopathy. Skin:     General: Skin is warm and dry. Coloration: Skin is not pale. Comments: Several irreg shaped moles, inhomogeneous mid-back    Neurological:      General: No focal deficit present. Mental Status: She is alert.    Psychiatric:         Mood and Affect: Mood normal.                 Lab Results   Component Value Date    CHOL 230 08/23/2021    CHOLFAST 219 09/23/2020    TRIG 70 08/23/2021    TRIGLYCFAST 89 09/23/2020    HDL 81 08/23/2021    LDLCALC 135 08/23/2021    GLUF 93 09/23/2020    GLUCOSE 90 08/23/2021    LABA1C 5.6 08/23/2021       The 10-year ASCVD risk score ([address] Yoselyn Arango StrGwen, et al., 2013) is: 2.1%    Values used to calculate the score:      Age: 64 years      Sex: Female      Is Non- : No      Diabetic: No      Tobacco smoker: No      Systolic Blood Pressure: 567 mmHg      Is BP treated: No      HDL Cholesterol: 81 mg/dL      Total Cholesterol: 230 mg/dL      An electronic signature was used to authenticate this note.     --Ritika Mijares MD

## 2021-11-10 ENCOUNTER — HOSPITAL ENCOUNTER (OUTPATIENT)
Dept: GENERAL RADIOLOGY | Age: 61
Discharge: HOME | End: 2021-11-10
Payer: COMMERCIAL

## 2021-11-10 DIAGNOSIS — Z78.0 ENCOUNTER FOR OSTEOPOROSIS SCREENING IN ASYMPTOMATIC POSTMENOPAUSAL PATIENT: ICD-10-CM

## 2021-11-10 DIAGNOSIS — Z13.820 ENCOUNTER FOR OSTEOPOROSIS SCREENING IN ASYMPTOMATIC POSTMENOPAUSAL PATIENT: ICD-10-CM

## 2021-11-10 PROBLEM — M81.0 AGE-RELATED OSTEOPOROSIS WITHOUT CURRENT PATHOLOGICAL FRACTURE: Status: ACTIVE | Noted: 2019-03-07

## 2021-11-10 PROCEDURE — 77080 DXA BONE DENSITY AXIAL: CPT

## 2021-11-15 PROBLEM — E55.9 VITAMIN D DEFICIENCY: Status: RESOLVED | Noted: 2017-04-20 | Resolved: 2021-11-15

## 2021-11-16 ENCOUNTER — TELEMEDICINE (OUTPATIENT)
Dept: INTERNAL MEDICINE CLINIC | Age: 61
End: 2021-11-16
Payer: COMMERCIAL

## 2021-11-16 DIAGNOSIS — M81.0 AGE-RELATED OSTEOPOROSIS WITHOUT CURRENT PATHOLOGICAL FRACTURE: Primary | ICD-10-CM

## 2021-11-16 PROCEDURE — 99213 OFFICE O/P EST LOW 20 MIN: CPT | Performed by: INTERNAL MEDICINE

## 2021-11-16 PROCEDURE — 3017F COLORECTAL CA SCREEN DOC REV: CPT | Performed by: INTERNAL MEDICINE

## 2021-11-16 PROCEDURE — G8427 DOCREV CUR MEDS BY ELIG CLIN: HCPCS | Performed by: INTERNAL MEDICINE

## 2021-11-16 RX ORDER — ALENDRONATE SODIUM 70 MG/1
70 TABLET ORAL
Qty: 12 TABLET | Refills: 3 | Status: SHIPPED | OUTPATIENT
Start: 2021-11-16

## 2021-11-16 SDOH — ECONOMIC STABILITY: FOOD INSECURITY: WITHIN THE PAST 12 MONTHS, YOU WORRIED THAT YOUR FOOD WOULD RUN OUT BEFORE YOU GOT MONEY TO BUY MORE.: NEVER TRUE

## 2021-11-16 SDOH — ECONOMIC STABILITY: FOOD INSECURITY: WITHIN THE PAST 12 MONTHS, THE FOOD YOU BOUGHT JUST DIDN'T LAST AND YOU DIDN'T HAVE MONEY TO GET MORE.: NEVER TRUE

## 2021-11-16 ASSESSMENT — SOCIAL DETERMINANTS OF HEALTH (SDOH): HOW HARD IS IT FOR YOU TO PAY FOR THE VERY BASICS LIKE FOOD, HOUSING, MEDICAL CARE, AND HEATING?: NOT HARD AT ALL

## 2021-11-16 NOTE — ASSESSMENT & PLAN NOTE
New diagnosis. Discussed natural history of osteoporosis and treatment options. Recent labs with normal calcium, vitamin D and renal function. Discussed treatment options. Start alendronate 70 mg weekly. Discussed risks and benefits of the medication, including most common side effects. Reviewed administration instructions. In middle of dental procedure (crown). Advised to complete and hold off until January to start medication.

## 2021-11-16 NOTE — PROGRESS NOTES
TELEHEALTH EVALUATION -- Audio/Visual (During [place] [id number] public [facility] emergency)  2021  Jessie Suarez Maryanne (: 1960)      ASSESSMENT/PLAN:  Age-related osteoporosis without current pathological fracture  Assessment & Plan:  New diagnosis. Discussed natural history of osteoporosis and treatment options. Recent labs with normal calcium, vitamin D and renal function. Discussed treatment options. Start alendronate 70 mg weekly. Discussed risks and benefits of the medication, including most common side effects. Reviewed administration instructions. In middle of dental procedure (crown). Advised to complete and hold off until January to start medication. Follow-up as advised at last routine appointment. SUBJECTIVE/OBJECTIVE:  64 y.o. female here for evaluation of the following chief complaint(s):   Chief Complaint   Patient presents with    Other     discuss lab results     New diagnosis of osteoporosis. No fractures. To discuss dx and treatment options. Plans to semi-retire end of the year. Anticipate increasing exercise, specifically weight-bearing exercise. Review of Systems    Patient-Reported Vitals 2021   Patient-Reported Weight 125 lb   Patient-Reported Height 5 5.5        Physical Exam    Jessie Gaffney, was evaluated through a synchronous (real-time) audio-video encounter. The patient (or guardian if applicable) is aware that this is a billable service. Verbal consent to proceed has been obtained within the past 12 months. The visit was conducted pursuant to the emergency declaration under the [address] [facility], [address] [place] [address] authority and the [employer] General Act. Patient identification was verified, and a caregiver was present when appropriate. The patient was located in a state where the provider was credentialed to provide care.       An electronic signature was used to authenticate this

## 2021-12-16 ENCOUNTER — NURSE ONLY (OUTPATIENT)
Dept: INTERNAL MEDICINE CLINIC | Age: 61
End: 2021-12-16
Payer: COMMERCIAL

## 2021-12-16 DIAGNOSIS — Z23 NEED FOR SHINGLES VACCINE: Primary | ICD-10-CM

## 2021-12-16 PROCEDURE — 90471 IMMUNIZATION ADMIN: CPT | Performed by: INTERNAL MEDICINE

## 2021-12-16 PROCEDURE — 90750 HZV VACC RECOMBINANT IM: CPT | Performed by: INTERNAL MEDICINE

## 2022-03-22 ENCOUNTER — HOSPITAL ENCOUNTER (OUTPATIENT)
Dept: MAMMOGRAPHY | Age: 62
Discharge: HOME | End: 2022-03-22
Payer: COMMERCIAL

## 2022-03-22 VITALS — HEIGHT: 66 IN | BODY MASS INDEX: 20.09 KG/M2 | WEIGHT: 125 LBS

## 2022-03-22 DIAGNOSIS — Z12.31 VISIT FOR SCREENING MAMMOGRAM: ICD-10-CM

## 2022-03-22 PROCEDURE — 77063 BREAST TOMOSYNTHESIS BI: CPT

## 2022-05-18 ENCOUNTER — E-VISIT (OUTPATIENT)
Dept: INTERNAL MEDICINE CLINIC | Age: 62
End: 2022-05-18
Payer: COMMERCIAL

## 2022-05-18 DIAGNOSIS — J01.90 ACUTE SINUSITIS, RECURRENCE NOT SPECIFIED, UNSPECIFIED LOCATION: Primary | ICD-10-CM

## 2022-05-18 PROCEDURE — 99421 OL DIG E/M SVC 5-10 MIN: CPT | Performed by: INTERNAL MEDICINE

## 2022-05-18 RX ORDER — AMOXICILLIN AND CLAVULANATE POTASSIUM 875; 125 MG/1; MG/1
1 TABLET, FILM COATED ORAL 2 TIMES DAILY
Qty: 14 TABLET | Refills: 0 | Status: SHIPPED | OUTPATIENT
Start: 2022-05-18 | End: 2022-05-25

## 2022-05-18 ASSESSMENT — LIFESTYLE VARIABLES
SMOKING_STATUS: NO, I'M A FORMER SMOKER
SMOKING_YEARS: 20
PACKS_PER_DAY: 1

## 2022-05-18 NOTE — PROGRESS NOTES
HPI: as per patient provided history, home covid test neg. Exam: N/A (electronic visit)  ASSESSMENT/PLAN:  Diagnoses and all orders for this visit:    Acute sinusitis, recurrence not specified, unspecified location    Other orders  -     amoxicillin-clavulanate (AUGMENTIN) 875-125 MG per tablet; Take 1 tablet by mouth 2 times daily for 7 days      Patient instructed to call the office if worsens, or fails to improve as anticipated. 5-10 minutes were spent on the digital evaluation and management of this patient.     Carissa Hall MD

## 2022-07-20 ENCOUNTER — TELEPHONE (OUTPATIENT)
Dept: INTERNAL MEDICINE CLINIC | Age: 62
End: 2022-07-20

## 2022-07-20 DIAGNOSIS — Z13.220 LIPID SCREENING: ICD-10-CM

## 2022-07-20 DIAGNOSIS — Z13.1 SCREENING FOR DIABETES MELLITUS: Primary | ICD-10-CM

## 2022-07-20 NOTE — TELEPHONE ENCOUNTER
----- Message from Juhi Michelle sent at 7/20/2022 10:48 AM EDT -----  Subject: Message to Provider    QUESTIONS  Information for Provider? Patient scheduled yearly physical for 8/30 and   wanting to know fi you want blood work done before or after appointment.  ---------------------------------------------------------------------------  --------------  Daryle Haver YJYP  [phone]; OK to leave message on voicemail  ---------------------------------------------------------------------------  --------------  SCRIPT ANSWERS  Relationship to Patient?  Self

## 2022-08-29 NOTE — PROGRESS NOTES
ASSESSMENT/PLAN:   Wellness exam  Discussed age appropriate preventive care including healthy diet, daily exercise, immunizations and age & gender guided screening tests. Pap and HPV obtained today. Advised MMR prior to travel to [facility]. Return in about 1 year (around 2023) for CPE. Saw Callaway (:  1960) is a 58 y.o. female, here for annual preventive visit. Going to [facility]. Asks bout immunizations. Typhoid. Tdap still up to date. MMR? Dr. Bety Ortega wanted her off of the macrobid. Had 2 back to back infection so back on the macrodantin. Also still using topical estradiol. Exercise: 30 minutes 4 x /week. Walking, cycling, kayaking. Diet: pretty healthy but likes cheese. Lots of fruits and vegetables. Not much refined. Ice cream 3-4x/week. No LMP recorded. Patient is postmenopausal.   Sexual activity: monogamous with spouse  She has no breast pain or new or enlarging lumps on self exam, no vaginal bleeding, no hot flashes  Menopausal/perimenopausal symptoms:none.     Hx abnormal PAP: no    Patient Care Team:  Husam Moe MD as PCP - General (Internal Medicine)  Husam Moe [facility] as PCP [facility]  Ede Plascencia MD as Consulting Physician (Gastroenterology)  Silvia Aponte MD as Consulting Physician (Urology)  ABEL Aguila CNP as Advanced Practice Nurse (Urology)    Health Maintenance   Topic Date Due    Cervical cancer screen  2022    Breast cancer screen  2023    Depression Screen  2023    Colorectal Cancer Screen  2024    Lipids  2027    DTaP/Tdap/Td vaccine (3 - Td or Tdap) 2029    DEXA (modify frequency per FRAX score)  Completed    Shingles vaccine  Completed    COVID-19 Vaccine  Completed    Hepatitis C screen  Completed    HIV screen  Completed    Hepatitis A vaccine  Aged Out    Hepatitis B vaccine  Aged Out    Hib vaccine  Aged Out    Meningococcal (ACWY) vaccine  Aged Out Pneumococcal 0-64 years Vaccine  Aged [employer] History   Administered Date(s) Administered    COVID-19, MODERNA BLUE border, Primary or Immunocompromised, (age 12y+), IM, 100 mcg/0.5mL 01/18/2021, 02/15/2021, 07/29/2022    COVID-19, MODERNA Booster BLUE border, (age 18y+), IM, 50mcg/0.25mL 11/27/2021    Hepatitis A 12/21/2012, 01/14/2014    Polio IPV (IPOL) 12/21/2012    Tdap (Boostrix, Adacel) 11/17/2008, 01/29/2019    Typhoid Live, Oral (Vivotif) 12/21/2012, 01/16/2018    Zoster Recombinant (Shingrix) 08/27/2021, 12/16/2021       Past Medical History:   Diagnosis Date    Age-related osteoporosis without current pathological fracture 3/7/2019    11/10/2021 L Spine (L 1-4): BMD= 0.792 g/cm2, T-score= -2.3, Z-score= -0.8. R Hip: BMD= 0.648 g/cm2, T-score= -2.4, Z-score= -1.4.   R Femoral Neck: BMD= 0.562 g/cm2, T-score= -2.6, Z-score= -1.2. The bone mineral density has decreased by 2% from prior. Anxiety disorder     with panic attacks, Dr. Sommer Herrera    Cervical disc disease     MRI 5/09 with disc bulge C5-6    Internal hemorrhoids     Microhematuria 11/27/2013    Normal cysto/CT 2017, and patient reports negative 11/13. Osteopenia of multiple sites 3/7/2019    Post-menopause     age 48    Recurrent cystitis 4/12/2017    Post-menopausal; Manage prophylactically with periurethral estrogen.  Dr. Gladys Barros added trimethoprim post-coital 2017    Seasonal allergies     May-July    Sigmoid diverticulosis     scant, noted on colonoscopy 11/14    Vitamin D deficiency 4/20/2017       Outpatient Medications Marked as Taking for the 8/30/22 encounter (Office Visit) with Carol Blackmon MD   Medication Sig Dispense Refill    Calcium Carbonate-Vit D-Min (CALCIUM 1200 PO)   0 Refill(s)      alendronate (FOSAMAX) 70 MG tablet Take 1 tablet by mouth every 7 days 12 tablet 3    nitrofurantoin (MACRODANTIN) 100 MG capsule Take 100 mg by mouth nightly       Polyethylene Glycol 3350 (MIRALAX PO) Take by mouth every morning      Cholecalciferol (VITAMIN D) 2000 units CAPS capsule Take 2,000 Units by mouth daily 30 capsule 11    estradiol (ESTRACE VAGINAL) 0.1 MG/GM vaginal cream Apply pea-size amount to periurethral area daily for 2 weeks and then 2-3x/week. 1 Tube 1    cetirizine (ZYRTEC) 10 MG tablet Take 10 mg by mouth daily as needed for Allergies. PARoxetine (PAXIL) 40 MG tablet Take 40 mg by mouth every morning. ALPRAZolam (XANAX XR) 1 MG extended release tablet Take 1 mg by mouth nightly. Allergies   Allergen Reactions    Influenza Vaccines Other (See Comments)     Fever and swollen axillary nodes       Past Surgical History:   Procedure Laterality Date    BREAST BIOPSY Right 10/2015    benign stroaml fiborsis and ductal hyperplasia.  No atypia    PELVIC LAPAROSCOPY      infertility    WISDOM TOOTH EXTRACTION             Social History     Tobacco Use    Smoking status: Former     Packs/day: 0.50     Years: 28.00     Pack years: 14.00     Types: Cigarettes     Quit date: 2010     Years since quittin.2    Smokeless tobacco: Never   Substance Use Topics    Alcohol use: Yes     Comment: 1 drink/month    Drug use: No        Family History   Problem Relation Age of Onset    Thyroid Disease Mother     Atrial Fibrillation Mother         subsequent stroke    Colon Cancer Father 58        Rectal     High Cholesterol Father     Colon Polyps Sister     Colon Polyps Brother     Breast Cancer Maternal Grandmother 48    Other Other         colon polyps    Coronary Art Dis Other 79        MI (\"anuerysm in heart\")    Depression Other     Crohn's Disease Niece         thinks other side of family    Ovarian Cancer Neg Hx      Review of Systems    Wt Readings from Last 5 Encounters:   22 126 lb 6.4 oz (57.3 kg)   22 125 lb (56.7 kg)   21 125 lb (56.7 kg)   20 128 lb (58.1 kg)   19 124 lb (56.2 kg)     Vitals:    22 0903   BP: 116/60   Site: Right Upper Arm   Position: Sitting   Cuff Size: Medium Adult   Pulse: 82   Resp: 14   Temp: 98.7 °F (37.1 °C)   TempSrc: Temporal   SpO2: 97%   Weight: 126 lb 6.4 oz (57.3 kg)   Height: 5' 6\" (1.676 m)     Estimated body mass index is 20.4 kg/m² as calculated from the following:    Height as of this encounter: 5' 6\" (1.676 m). Weight as of this encounter: 126 lb 6.4 oz (57.3 kg). Physical Exam  Constitutional:       Appearance: She is well-developed. HENT:      Right Ear: Tympanic membrane and ear canal normal.      Left Ear: Tympanic membrane and ear canal normal.   Eyes:      Conjunctiva/sclera: Conjunctivae normal.      Pupils: Pupils are equal, round, and reactive to light. Neck:      Thyroid: No thyromegaly. Cardiovascular:      Rate and Rhythm: Normal rate and regular rhythm. Pulmonary:      Effort: Pulmonary effort is normal.      Breath sounds: Normal breath sounds. Chest:   Breasts:     Right: No mass, nipple discharge, skin change or tenderness. Left: Inverted nipple and mass (1 x 1.5 cm oval soft tissue lesion in between  1-2 oclock (unchanged)) present. No nipple discharge, skin change or tenderness. Abdominal:      General: Bowel sounds are normal.      Palpations: Abdomen is soft. There is no mass. Tenderness: There is no abdominal tenderness. Genitourinary:     Labia:         Right: No rash or tenderness. Left: No rash or tenderness. Cervix: Normal.      Uterus: Normal.       Adnexa: Right adnexa normal and left adnexa normal.   Musculoskeletal:      Cervical back: Neck supple. Right lower leg: No edema. Left lower leg: No edema. Lymphadenopathy:      Cervical: No cervical adenopathy. Skin:     General: Skin is warm and dry. Coloration: Skin is not pale. Neurological:      General: No focal deficit present. Mental Status: She is alert. Psychiatric:         Mood and Affect: Mood normal.       No flowsheet data found.     Lab Results   Component Value Date/Time    CHOL 230 08/23/2021 08:34 AM    CHOLFAST 246 08/22/2022 08:54 AM    TRIG 70 08/23/2021 08:34 AM    TRIGLYCFAST 80 08/22/2022 08:54 AM    HDL 75 08/22/2022 08:54 AM    LDLCALC 155 08/22/2022 08:54 AM    GLUF 88 08/22/2022 08:54 AM    GLUCOSE 90 08/23/2021 08:34 AM    LABA1C 5.6 08/22/2022 08:54 AM       The 10-year ASCVD risk score (Rangel Britton, et al., 2013) is: 3.2%    Values used to calculate the score:      Age: 58 years      Sex: Female      Is Non- : No      Diabetic: No      Tobacco smoker: No      Systolic Blood Pressure: 614 mmHg      Is BP treated: No      HDL Cholesterol: 75 mg/dL      Total Cholesterol: 246 mg/dL      An electronic signature was used to authenticate this note.     --Husam Moe MD

## 2022-08-30 ENCOUNTER — OFFICE VISIT (OUTPATIENT)
Dept: INTERNAL MEDICINE CLINIC | Age: 62
End: 2022-08-30
Payer: COMMERCIAL

## 2022-08-30 VITALS
HEIGHT: 66 IN | DIASTOLIC BLOOD PRESSURE: 60 MMHG | OXYGEN SATURATION: 97 % | RESPIRATION RATE: 14 BRPM | WEIGHT: 126.4 LBS | BODY MASS INDEX: 20.31 KG/M2 | SYSTOLIC BLOOD PRESSURE: 116 MMHG | HEART RATE: 82 BPM | TEMPERATURE: 98.7 F

## 2022-08-30 DIAGNOSIS — Z00.00 WELLNESS EXAMINATION: Primary | ICD-10-CM

## 2022-08-30 DIAGNOSIS — Z12.4 CERVICAL CANCER SCREENING: ICD-10-CM

## 2022-08-30 PROCEDURE — 99396 PREV VISIT EST AGE 40-64: CPT | Performed by: INTERNAL MEDICINE

## 2022-08-30 ASSESSMENT — PATIENT HEALTH QUESTIONNAIRE - PHQ9
2. FEELING DOWN, DEPRESSED OR HOPELESS: 0
SUM OF ALL RESPONSES TO PHQ9 QUESTIONS 1 & 2: 0
1. LITTLE INTEREST OR PLEASURE IN DOING THINGS: 0
SUM OF ALL RESPONSES TO PHQ QUESTIONS 1-9: 0

## 2022-08-30 NOTE — PATIENT INSTRUCTIONS
Try voltaren gel (diclofenac) for thumb pain. If not adequate tylenol in morning. Hand/wrist specialist   Mau Grier MD  [phone]      Recommendations for optimal health:  Be sure you are exercising at least 30 minutes  or 10,000 steps daily. Ideally you should try to get a mix of cardio and strength exercises. Work on [employer] For more detailed information, visit Nutrition Source web site- knowledge for healthy eating from [address] [place] [address]. [facility]      If your are using supplements, look for \"USP verified\" on the label. Helps to assure they are good quality. Vitamin D 800 units daily. Calcium intake - try for 800-1200 mg of calcium in combination of diet and supplements. You can read on this in much more detail on nutritionCO Everywhere. org    8 Nutrition Tips for Eating Right:  1. Choose good carbs, not no carbs. Whole grains are your best bet. 2. Pay attention to the protein package. Fish, poultry, nuts, and beans are the best choices. 3. Choose foods with healthy fats, limit foods high in saturated fat, and avoid foods with trans fat. Plant oils, nuts, and fish are the healthiest sources. 4. Choose a fiber-filled diet, rich in whole grains, vegetables, and fruits. 5. Eat more vegetables and fruits. Go for color and variety--dark green, yellow, orange, and red. 6. Calcium is important. But milk isnt the only, or even best, source. 7. Water is best to quench your thirst. Skip the sugary drinks, and go easy on the milk and juice. 8. Eating less salt is good for everyones health. Choose more fresh foods and fewer processed foods. Aim for 2-3 cups of vegetables daily and 1 1/2-2 cups of fruits daily.

## 2022-12-18 RX ORDER — ALENDRONATE SODIUM 70 MG/1
TABLET ORAL
Qty: 12 TABLET | Refills: 1 | Status: SHIPPED | OUTPATIENT
Start: 2022-12-18

## 2023-01-06 LAB
ALBUMIN SERPL-MCNC: 5.5 G/DL (ref 3.5–5.2)
ALP BLD-CCNC: 50 U/L (ref 42–98)
ALT SERPL-CCNC: 22 U/L (ref 0–34)
AST SERPL-CCNC: 99 U/L (ref 5–34)
BILIRUB SERPL-MCNC: 0.7 MG/DL (ref 0.1–1.2)
BUN / CREAT RATIO: 15.7 RATIO (ref 9–28)
BUN BLDV-MCNC: 11 MG/DL (ref 6–20)
CALCIUM SERPL-MCNC: 11.1 MG/DL (ref 8.6–10.3)
CHLORIDE BLD-SCNC: 99 MMOL/L (ref 97–107)
CO2: 27 MMOL/L (ref 21–31)
CREAT SERPL-MCNC: 0.7 MG/DL (ref 0.6–1)
GLUCOSE: 81 MG/DL (ref 74–106)
POTASSIUM SERPL-SCNC: 4.6 MMOL/L (ref 3.6–5)
SODIUM BLD-SCNC: 135 MMOL/L (ref 135–145)
TOTAL PROTEIN: 7.8 G/DL (ref 6.4–8.3)

## 2023-01-11 ENCOUNTER — TELEPHONE (OUTPATIENT)
Dept: INTERNAL MEDICINE CLINIC | Age: 63
End: 2023-01-11

## 2023-01-11 DIAGNOSIS — E83.52 HYPERCALCEMIA: Primary | ICD-10-CM

## 2023-01-11 RX ORDER — MULTIVIT-MIN/IRON/FOLIC ACID/K 18-600-40
2000 CAPSULE ORAL DAILY
Qty: 30 CAPSULE | Refills: 11 | Status: SHIPPED | COMMUNITY
Start: 2023-01-11 | End: 2023-01-11

## 2023-01-11 RX ORDER — MULTIVIT-MIN/IRON/FOLIC ACID/K 18-600-40
5000 CAPSULE ORAL DAILY
Qty: 30 CAPSULE | Refills: 11
Start: 2023-01-11

## 2023-01-11 NOTE — TELEPHONE ENCOUNTER
Patient is requesting a call back from Dr. Aly when possible regarding her CMP she had completed at Dr. Maza's office. She is concerned about her calcium value as she is currently taking a calcium supplement. Does a telephone call appointment need to be scheduled to discuss this? Please advise.

## 2023-01-11 NOTE — TELEPHONE ENCOUNTER
I'd like her to stop back in any [facility] for a recheck of calcium as well as a vitamin d level  this week if possible. Doesn't need to be fastng for labs.  Hold  the calcium and vitamin D for now

## 2023-01-11 NOTE — TELEPHONE ENCOUNTER
Patient called back and she takes 1200 mg per capsule but also has Vitamin D in it also. She just takes 1 per day. The calcium has 25 mcg of the Vitamin D  and she also takes  a D3 which has 125 mcg per day.

## 2023-01-11 NOTE — TELEPHONE ENCOUNTER
Left message for patient asking her to call back to let me know how much calcium is in each tablet she is taking, and how many tablets per day. Also to confirm that her vitamin D is still 2000 units a day. Once I can see what the total calcium is I can advise her on next steps.

## 2023-01-12 DIAGNOSIS — E83.52 HYPERCALCEMIA: ICD-10-CM

## 2023-01-12 LAB
ALBUMIN SERPL-MCNC: 4.5 G/DL (ref 3.4–5)
ANION GAP SERPL CALCULATED.3IONS-SCNC: 13 MMOL/L (ref 3–16)
BUN BLDV-MCNC: 9 MG/DL (ref 7–20)
CALCIUM SERPL-MCNC: 9.4 MG/DL (ref 8.3–10.6)
CHLORIDE BLD-SCNC: 97 MMOL/L (ref 99–110)
CO2: 26 MMOL/L (ref 21–32)
CREAT SERPL-MCNC: 0.6 MG/DL (ref 0.6–1.2)
GFR SERPL CREATININE-BSD FRML MDRD: >60 ML/MIN/{1.73_M2}
GLUCOSE BLD-MCNC: 119 MG/DL (ref 70–99)
PARATHYROID HORMONE INTACT: 29.5 PG/ML (ref 14–72)
PHOSPHORUS: 4 MG/DL (ref 2.5–4.9)
POTASSIUM SERPL-SCNC: 4.6 MMOL/L (ref 3.5–5.1)
SODIUM BLD-SCNC: 136 MMOL/L (ref 136–145)
VITAMIN D 25-HYDROXY: 67.3 NG/ML

## 2023-02-02 DIAGNOSIS — R73.9 HYPERGLYCEMIA: Primary | ICD-10-CM

## 2023-02-07 DIAGNOSIS — R73.9 HYPERGLYCEMIA: ICD-10-CM

## 2023-02-07 LAB
ESTIMATED AVERAGE GLUCOSE: 108.3 MG/DL
GLUCOSE FASTING: 95 MG/DL (ref 70–99)
HBA1C MFR BLD: 5.4 %

## 2023-06-01 ENCOUNTER — HOSPITAL ENCOUNTER (OUTPATIENT)
Dept: MAMMOGRAPHY | Age: 63
Discharge: HOME | End: 2023-06-01
Payer: COMMERCIAL

## 2023-06-01 VITALS — BODY MASS INDEX: 20.25 KG/M2 | WEIGHT: 126 LBS | HEIGHT: 66 IN

## 2023-06-01 DIAGNOSIS — Z12.31 VISIT FOR SCREENING MAMMOGRAM: ICD-10-CM

## 2023-06-01 PROCEDURE — 77063 BREAST TOMOSYNTHESIS BI: CPT

## 2023-06-02 RX ORDER — ALENDRONATE SODIUM 70 MG/1
TABLET ORAL
Qty: 12 TABLET | Refills: 0 | Status: SHIPPED | OUTPATIENT
Start: 2023-06-02

## 2023-06-02 NOTE — TELEPHONE ENCOUNTER
Last appointment: 8/30/2022  Next appointment: Visit date not found  Last refill: 12/18/22  Sent "[employer] message to schedule next appointment due in August.

## 2023-08-30 RX ORDER — ALENDRONATE SODIUM 70 MG/1
TABLET ORAL
Qty: 12 TABLET | Refills: 0 | Status: SHIPPED | OUTPATIENT
Start: 2023-08-30

## 2023-08-30 NOTE — TELEPHONE ENCOUNTER
Medication:   Requested Prescriptions     Pending Prescriptions Disp Refills    alendronate (FOSAMAX) 70 MG tablet [Pharmacy Med Name: ALENDRONATE SODIUM 70 MG TAB] 12 tablet 0     Sig: TAKE 1 TABLET BY MOUTH ONCE WEEKLY ON AN EMPTY STOMACH BEFORE BREAKFAST. REMAIN UPRIGHT FOR 30 MINUTES AND TAKE WITH 8 OUNCES OF WATER        Last Filled:      Patient Phone Number: [phone] (home)     Last appt: 8/30/2022   Next appt: 9/29/2023    Last OARRS: No flowsheet data found.

## 2023-11-06 ASSESSMENT — PATIENT HEALTH QUESTIONNAIRE - PHQ9
SUM OF ALL RESPONSES TO PHQ QUESTIONS 1-9: 0
SUM OF ALL RESPONSES TO PHQ9 QUESTIONS 1 & 2: 0
2. FEELING DOWN, DEPRESSED OR HOPELESS: NOT AT ALL
SUM OF ALL RESPONSES TO PHQ QUESTIONS 1-9: 0
SUM OF ALL RESPONSES TO PHQ9 QUESTIONS 1 & 2: 0
1. LITTLE INTEREST OR PLEASURE IN DOING THINGS: NOT AT ALL
SUM OF ALL RESPONSES TO PHQ QUESTIONS 1-9: 0
2. FEELING DOWN, DEPRESSED OR HOPELESS: 0
1. LITTLE INTEREST OR PLEASURE IN DOING THINGS: 0
SUM OF ALL RESPONSES TO PHQ QUESTIONS 1-9: 0

## 2023-11-07 ENCOUNTER — OFFICE VISIT (OUTPATIENT)
Dept: INTERNAL MEDICINE CLINIC | Age: 63
End: 2023-11-07
Payer: COMMERCIAL

## 2023-11-07 VITALS
BODY MASS INDEX: 20.73 KG/M2 | HEIGHT: 66 IN | WEIGHT: 129 LBS | SYSTOLIC BLOOD PRESSURE: 112 MMHG | DIASTOLIC BLOOD PRESSURE: 56 MMHG | OXYGEN SATURATION: 99 % | HEART RATE: 72 BPM

## 2023-11-07 DIAGNOSIS — Z13.220 LIPID SCREENING: ICD-10-CM

## 2023-11-07 DIAGNOSIS — E83.52 HYPERCALCEMIA: ICD-10-CM

## 2023-11-07 DIAGNOSIS — Z13.1 SCREENING FOR DIABETES MELLITUS: ICD-10-CM

## 2023-11-07 DIAGNOSIS — Z00.00 WELL ADULT EXAM: Primary | ICD-10-CM

## 2023-11-07 DIAGNOSIS — E78.00 HYPERCHOLESTEROLEMIA: ICD-10-CM

## 2023-11-07 PROCEDURE — 99396 PREV VISIT EST AGE 40-64: CPT | Performed by: INTERNAL MEDICINE

## 2023-11-07 RX ORDER — CEPHALEXIN 250 MG/1
250 CAPSULE ORAL DAILY
COMMUNITY

## 2023-11-07 SDOH — ECONOMIC STABILITY: HOUSING INSECURITY
IN THE LAST 12 MONTHS, WAS THERE A TIME WHEN YOU DID NOT HAVE A STEADY PLACE TO SLEEP OR SLEPT IN A SHELTER (INCLUDING NOW)?: NO

## 2023-11-07 SDOH — ECONOMIC STABILITY: FOOD INSECURITY: WITHIN THE PAST 12 MONTHS, THE FOOD YOU BOUGHT JUST DIDN'T LAST AND YOU DIDN'T HAVE MONEY TO GET MORE.: NEVER TRUE

## 2023-11-07 SDOH — ECONOMIC STABILITY: INCOME INSECURITY: HOW HARD IS IT FOR YOU TO PAY FOR THE VERY BASICS LIKE FOOD, HOUSING, MEDICAL CARE, AND HEATING?: NOT HARD AT ALL

## 2023-11-07 SDOH — ECONOMIC STABILITY: FOOD INSECURITY: WITHIN THE PAST 12 MONTHS, YOU WORRIED THAT YOUR FOOD WOULD RUN OUT BEFORE YOU GOT MONEY TO BUY MORE.: NEVER TRUE

## 2023-11-07 ASSESSMENT — ENCOUNTER SYMPTOMS
EYES NEGATIVE: 1
GASTROINTESTINAL NEGATIVE: 1
RESPIRATORY NEGATIVE: 1

## 2023-11-08 LAB
25(OH)D3 SERPL-MCNC: 36.6 NG/ML
ANION GAP SERPL CALCULATED.3IONS-SCNC: 7 MMOL/L (ref 3–16)
BUN SERPL-MCNC: 13 MG/DL (ref 7–20)
CALCIUM SERPL-MCNC: 9.9 MG/DL (ref 8.3–10.6)
CHLORIDE SERPL-SCNC: 97 MMOL/L (ref 99–110)
CHOLEST SERPL-MCNC: 193 MG/DL (ref 0–199)
CO2 SERPL-SCNC: 31 MMOL/L (ref 21–32)
CREAT SERPL-MCNC: 0.7 MG/DL (ref 0.6–1.2)
EST. AVERAGE GLUCOSE BLD GHB EST-MCNC: 111.2 MG/DL
GFR SERPLBLD CREATININE-BSD FMLA CKD-EPI: >60 ML/MIN/{1.73_M2}
GLUCOSE SERPL-MCNC: 102 MG/DL (ref 70–99)
HBA1C MFR BLD: 5.5 %
HDLC SERPL-MCNC: 71 MG/DL (ref 40–60)
LDLC SERPL CALC-MCNC: 96 MG/DL
POTASSIUM SERPL-SCNC: 4.6 MMOL/L (ref 3.5–5.1)
SODIUM SERPL-SCNC: 135 MMOL/L (ref 136–145)
TRIGL SERPL-MCNC: 132 MG/DL (ref 0–150)
VLDLC SERPL CALC-MCNC: 26 MG/DL

## 2023-11-28 RX ORDER — ALENDRONATE SODIUM 70 MG/1
TABLET ORAL
Qty: 12 TABLET | Refills: 3 | Status: SHIPPED | OUTPATIENT
Start: 2023-11-28

## 2023-11-28 NOTE — TELEPHONE ENCOUNTER
Last appointment: 11/7/2023  Next appointment: Visit date not found  Last refill: 8/30/2023  Appointment not due for >6 months.

## 2024-04-22 ENCOUNTER — TELEPHONE (OUTPATIENT)
Dept: INTERNAL MEDICINE CLINIC | Age: 64
End: 2024-04-22

## 2024-04-22 NOTE — TELEPHONE ENCOUNTER
Pt called asking to be seen today in person because she has been experiencing upper respiratory issues, bad cough, and shortness of breath while walking up the steps. Pt declined E -Visit. She would like a call back,thx.

## 2024-04-22 NOTE — TELEPHONE ENCOUNTER
Tried to call pt back, no answer, left vm letting her know Dr Aly is not in office today, offered overflow or urgert care, via vm. Asked the pt to call the office back if she choose to see the overflow team

## 2024-04-23 NOTE — TELEPHONE ENCOUNTER
Left patient message to call back to the office.  I'd like to see her tomorrow if she is not feeling any better.

## 2024-05-31 ENCOUNTER — TELEPHONE (OUTPATIENT)
Dept: FAMILY MEDICINE CLINIC | Age: 64
End: 2024-05-31

## 2024-05-31 NOTE — TELEPHONE ENCOUNTER
----- Message from Castro Camejo sent at 5/31/2024 10:38 AM EDT -----  Regarding: [facility] Appointment Request  [facility] Appointment Request    Patient needs appointment for [facility] Appointment Type: New to Provider.    Reason for Appointment Request: Requested Provider unavailable. Patient wants Dr. Zahra Llamas to be her new permanent primary care provider. Preferred to be schedule on Mondays or Fridays.   --------------------------------------------------------------------------------------------------------------------------    Relationship to Patient: Self     Call Back Information: OK to leave message on [employer] (Message on [employer] as well)  Preferred Call Back Number: Phone [phone]

## 2024-05-31 NOTE — TELEPHONE ENCOUNTER
Patient scheduled.    Recent Visits  Date Type Provider Dept   11/07/23 Office Visit Bere Aly MD [facility]   Showing recent visits within past 540 days with a meds authorizing provider and meeting all other requirements  Future Appointments  Date Type Provider Dept   08/28/24 Appointment Estee Herbert MD [facility]   Showing future appointments within next 150 days with a meds authorizing provider and meeting all other requirements     Normal for race

## 2024-06-19 ENCOUNTER — HOSPITAL ENCOUNTER (OUTPATIENT)
Dept: MAMMOGRAPHY | Age: 64
Discharge: HOME | End: 2024-06-19
Payer: COMMERCIAL

## 2024-06-19 VITALS — BODY MASS INDEX: 20.09 KG/M2 | HEIGHT: 66 IN | WEIGHT: 125 LBS

## 2024-06-19 DIAGNOSIS — Z12.31 VISIT FOR SCREENING MAMMOGRAM: ICD-10-CM

## 2024-06-19 PROCEDURE — 77063 BREAST TOMOSYNTHESIS BI: CPT

## 2024-06-25 ENCOUNTER — OFFICE VISIT (OUTPATIENT)
Dept: FAMILY MEDICINE CLINIC | Age: 64
End: 2024-06-25
Payer: COMMERCIAL

## 2024-06-25 VITALS
DIASTOLIC BLOOD PRESSURE: 62 MMHG | OXYGEN SATURATION: 98 % | HEART RATE: 82 BPM | BODY MASS INDEX: 20.5 KG/M2 | WEIGHT: 127 LBS | SYSTOLIC BLOOD PRESSURE: 108 MMHG

## 2024-06-25 DIAGNOSIS — Z78.0 POSTMENOPAUSAL: ICD-10-CM

## 2024-06-25 DIAGNOSIS — N30.90 RECURRENT CYSTITIS: Primary | ICD-10-CM

## 2024-06-25 DIAGNOSIS — F41.9 ANXIETY DISORDER, UNSPECIFIED TYPE: ICD-10-CM

## 2024-06-25 DIAGNOSIS — E78.00 HYPERCHOLESTEROLEMIA: ICD-10-CM

## 2024-06-25 DIAGNOSIS — J45.20 MILD INTERMITTENT EXTRINSIC ASTHMA WITHOUT COMPLICATION: ICD-10-CM

## 2024-06-25 DIAGNOSIS — M81.0 AGE-RELATED OSTEOPOROSIS WITHOUT CURRENT PATHOLOGICAL FRACTURE: ICD-10-CM

## 2024-06-25 PROBLEM — J45.909 EXTRINSIC ASTHMA WITHOUT COMPLICATION: Status: ACTIVE | Noted: 2024-06-25

## 2024-06-25 PROCEDURE — 99214 OFFICE O/P EST MOD 30 MIN: CPT | Performed by: STUDENT IN AN ORGANIZED HEALTH CARE EDUCATION/TRAINING PROGRAM

## 2024-06-25 RX ORDER — FLUTICASONE PROPIONATE 50 MCG
1 SPRAY, SUSPENSION (ML) NASAL 2 TIMES DAILY
COMMUNITY

## 2024-06-25 RX ORDER — BUDESONIDE AND FORMOTEROL FUMARATE DIHYDRATE 160; 4.5 UG/1; UG/1
AEROSOL RESPIRATORY (INHALATION)
COMMUNITY
Start: 2024-06-15

## 2024-06-25 RX ORDER — ALENDRONATE SODIUM 70 MG/1
TABLET ORAL
Qty: 12 TABLET | Refills: 3 | Status: SHIPPED | OUTPATIENT
Start: 2024-06-25

## 2024-06-25 ASSESSMENT — ENCOUNTER SYMPTOMS
SHORTNESS OF BREATH: 0
ABDOMINAL PAIN: 0

## 2024-06-25 ASSESSMENT — PATIENT HEALTH QUESTIONNAIRE - PHQ9
SUM OF ALL RESPONSES TO PHQ QUESTIONS 1-9: 0
SUM OF ALL RESPONSES TO PHQ QUESTIONS 1-9: 0
1. LITTLE INTEREST OR PLEASURE IN DOING THINGS: NOT AT ALL
SUM OF ALL RESPONSES TO PHQ QUESTIONS 1-9: 0
SUM OF ALL RESPONSES TO PHQ9 QUESTIONS 1 & 2: 0
2. FEELING DOWN, DEPRESSED OR HOPELESS: NOT AT ALL
SUM OF ALL RESPONSES TO PHQ QUESTIONS 1-9: 0

## 2024-06-25 NOTE — ASSESSMENT & PLAN NOTE
Well-controlled, continue current medications    Chronic, stable   Discussed natural history of osteoporosis and treatment options. Recent labs with normal calcium, vitamin D and renal function.   Discussed treatment options.   Continue 70 mg weekly. Discussed risks and benefits of the medication, including most common side effects. Reviewed administration instructions.

## 2024-06-25 NOTE — PROGRESS NOTES
Jessie Romero is a 64 y.o.female who presents with   Chief Complaint   Patient presents with    Establish Care     New to provider   Portions of this chart may have been created with voice recognition software. Occasional wrong-word or \"sound-alike\" substitutions may have occurred due to the inherent limitations of voice recognition software. Read the chart carefully and recognize, using context, where these substitutions have occurred      Patient presents to establish care.    Exercise: 3-4 x per week   Diet: Well balanced  Substance use: Denies  Stress: Manageable  Sleep: ~7-8 hours per night  Feels safe at home         Review of Systems   Constitutional:  Negative for fatigue.   Eyes:  Negative for visual disturbance.   Respiratory:  Negative for shortness of breath.    Cardiovascular:  Negative for chest pain.   Gastrointestinal:  Negative for abdominal pain.   Skin:  Negative for rash.   Neurological:  Negative for dizziness, light-headedness and headaches.        Past Medical History:   Diagnosis Date    Age-related osteoporosis without current pathological fracture 03/07/2019    11/10/2021 L Spine (L 1-4): BMD= 0.792 g/cm2, T-score= -2.3, Z-score= -0.8.    R Hip: BMD= 0.648 g/cm2, T-score= -2.4, Z-score= -1.4.   R Femoral Neck: BMD= 0.562 g/cm2, T-score= -2.6, Z-score= -1.2. The bone mineral density has decreased by 2% from prior.      Anxiety disorder     with panic attacks, Dr. Scott    Cervical disc disease     MRI 5/09 with disc bulge C5-6    Internal hemorrhoids     Microhematuria 11/27/2013    Normal cysto/CT 2017, and patient reports negative 11/13.     Microhematuria 11/27/2013    Cysto negative again 2017, Dr. Maza.  Negative 11/13.    Osteopenia of multiple sites 03/07/2019    Post-menopause     age 50    Recurrent cystitis 04/12/2017    Post-menopausal; Manage prophylactically with periurethral estrogen. Dr. Maza added trimethoprim post-coital 2017    Seasonal allergies

## 2024-06-25 NOTE — ASSESSMENT & PLAN NOTE
New problem  Seeing pulmonology   Her Oxygen dropped, and wheezing with coughing x 3 weeks  Currently on Symbicort daily and Albuterol prn (hasn't needed it)

## 2024-06-25 NOTE — PATIENT INSTRUCTIONS
Record release form signature and sent records from Pulmonology  Guidelines for care with Dr. Lomas and use of [employer]:     - Please allow 72 hours for response to [employer] Messages. If your concern cannot wait, please schedule an office visit.   - Please allow 72 hours for my comment on blood work or imaging. Occasionally there may be delays.  - If you have specific questions regarding your blood work or imaging, please schedule an office visit.   - If you have a new symptom, please schedule an office visit.   - Medication changes are only made at appointments. If you require a medication change, please schedule an office visit.   - Antibiotics cannot be prescribed without an in office evaluation.   - Controlled substances require IN PERSON office visits every 3 months.  - If you are on a controlled substance, a drug screen will be performed. I will require a negative drug screen.  - If you are late to your appointment, our visit time is limited.  - We can discuss 1-2 problems during follow up appointments, these are 15 minutes long.   - If you have multiple concerns, feel free to schedule multiple appointments. We cannot extend our visit time past 15 minutes regardless of the time the appointment is scheduled, out of consideration for all staff.  - Annual Physical exams are for discussion of Chronic Conditions and general wellness. We may discuss 1-2 new issues if time allows. You can always schedule a follow up appointment.     I would like to provide adequate and thorough attention to your care, which is why I limit discussion of 1-2 problems at a time. You are always welcome to schedule follow-ups with me for any new concerns.    I am here to support you and happy to work with you!    Dr. Lomas

## 2024-09-30 ENCOUNTER — HOSPITAL ENCOUNTER (OUTPATIENT)
Dept: GENERAL RADIOLOGY | Age: 64
Discharge: HOME | End: 2024-09-30
Attending: STUDENT IN AN ORGANIZED HEALTH CARE EDUCATION/TRAINING PROGRAM
Payer: COMMERCIAL

## 2024-09-30 DIAGNOSIS — Z78.0 POSTMENOPAUSAL: ICD-10-CM

## 2024-09-30 PROCEDURE — 77080 DXA BONE DENSITY AXIAL: CPT

## 2024-11-22 ENCOUNTER — OFFICE VISIT (OUTPATIENT)
Dept: FAMILY MEDICINE CLINIC | Age: 64
End: 2024-11-22
Payer: COMMERCIAL

## 2024-11-22 VITALS
HEIGHT: 66 IN | OXYGEN SATURATION: 98 % | HEART RATE: 71 BPM | BODY MASS INDEX: 20.22 KG/M2 | SYSTOLIC BLOOD PRESSURE: 110 MMHG | DIASTOLIC BLOOD PRESSURE: 68 MMHG | WEIGHT: 125.8 LBS

## 2024-11-22 DIAGNOSIS — Z00.00 WELL ADULT EXAM: Primary | ICD-10-CM

## 2024-11-22 DIAGNOSIS — R09.82 POST-NASAL DRIP: ICD-10-CM

## 2024-11-22 PROCEDURE — 99396 PREV VISIT EST AGE 40-64: CPT | Performed by: STUDENT IN AN ORGANIZED HEALTH CARE EDUCATION/TRAINING PROGRAM

## 2024-11-22 RX ORDER — IPRATROPIUM BROMIDE 21 UG/1
2 SPRAY, METERED NASAL EVERY 12 HOURS
Qty: 30 ML | Refills: 3 | Status: SHIPPED | OUTPATIENT
Start: 2024-11-22

## 2024-11-22 SDOH — ECONOMIC STABILITY: FOOD INSECURITY: WITHIN THE PAST 12 MONTHS, THE FOOD YOU BOUGHT JUST DIDN'T LAST AND YOU DIDN'T HAVE MONEY TO GET MORE.: NEVER TRUE

## 2024-11-22 SDOH — ECONOMIC STABILITY: INCOME INSECURITY: HOW HARD IS IT FOR YOU TO PAY FOR THE VERY BASICS LIKE FOOD, HOUSING, MEDICAL CARE, AND HEATING?: NOT HARD AT ALL

## 2024-11-22 SDOH — ECONOMIC STABILITY: FOOD INSECURITY: WITHIN THE PAST 12 MONTHS, YOU WORRIED THAT YOUR FOOD WOULD RUN OUT BEFORE YOU GOT MONEY TO BUY MORE.: NEVER TRUE

## 2024-11-22 NOTE — PROGRESS NOTES
Jessie Romero is a 64 y.o.female who presents with   Chief Complaint   Patient presents with    Annual Exam     Patient presents to for annual physical exam.    Exercise: relatively active  Diet: Well balanced  Stress: Manageable  Sleep: ~7 hours per night  Feels safe at home               Review of Systems   Constitutional:  Negative for appetite change, diaphoresis, fatigue and unexpected weight change.   HENT:  Positive for rhinorrhea. Negative for hearing loss.    Eyes:  Negative for visual disturbance.   Respiratory:  Negative for chest tightness and shortness of breath.    Cardiovascular:  Negative for chest pain, palpitations and leg swelling.   Gastrointestinal:  Negative for abdominal pain and blood in stool.   Endocrine: Negative for polyuria.   Genitourinary:  Negative for difficulty urinating.   Neurological:  Negative for dizziness, light-headedness and numbness.        Past Medical History:   Diagnosis Date    Age-related osteoporosis without current pathological fracture 03/07/2019    11/10/2021 L Spine (L 1-4): BMD= 0.792 g/cm2, T-score= -2.3, Z-score= -0.8.    R Hip: BMD= 0.648 g/cm2, T-score= -2.4, Z-score= -1.4.   R Femoral Neck: BMD= 0.562 g/cm2, T-score= -2.6, Z-score= -1.2. The bone mineral density has decreased by 2% from prior.      Anxiety disorder     with panic attacks, Dr. Scott    Cervical disc disease     MRI 5/09 with disc bulge C5-6    Internal hemorrhoids     Microhematuria 11/27/2013    Normal cysto/CT 2017, and patient reports negative 11/13.     Microhematuria 11/27/2013    Cysto negative again 2017, Dr. Maza.  Negative 11/13.    Osteoarthritis     Osteopenia of multiple sites 03/07/2019    Post-menopause     age 50    Recurrent cystitis 04/12/2017    Post-menopausal; Manage prophylactically with periurethral estrogen. Dr. Maza added trimethoprim post-coital 2017    Seasonal allergies     May-July    Sigmoid diverticulosis     scant, noted on colonoscopy 11/14

## 2024-11-26 DIAGNOSIS — Z00.00 WELL ADULT EXAM: ICD-10-CM

## 2024-11-27 LAB
25(OH)D3 SERPL-MCNC: 34.7 NG/ML
ALBUMIN SERPL-MCNC: 4.6 G/DL (ref 3.4–5)
ALBUMIN/GLOB SERPL: 2 {RATIO} (ref 1.1–2.2)
ALP SERPL-CCNC: 55 U/L (ref 40–129)
ALT SERPL-CCNC: 16 U/L (ref 10–40)
ANION GAP SERPL CALCULATED.3IONS-SCNC: 11 MMOL/L (ref 3–16)
ANISOCYTOSIS BLD QL SMEAR: ABNORMAL
AST SERPL-CCNC: 22 U/L (ref 15–37)
BASOPHILS # BLD: 0.1 K/UL (ref 0–0.2)
BASOPHILS NFR BLD: 1 %
BILIRUB SERPL-MCNC: 0.4 MG/DL (ref 0–1)
BUN SERPL-MCNC: 11 MG/DL (ref 7–20)
BURR CELLS BLD QL SMEAR: ABNORMAL
CALCIUM SERPL-MCNC: 9.4 MG/DL (ref 8.3–10.6)
CHLORIDE SERPL-SCNC: 99 MMOL/L (ref 99–110)
CHOLEST SERPL-MCNC: 195 MG/DL (ref 0–199)
CO2 SERPL-SCNC: 26 MMOL/L (ref 21–32)
CREAT SERPL-MCNC: 0.7 MG/DL (ref 0.6–1.2)
DEPRECATED RDW RBC AUTO: 17 % (ref 12.4–15.4)
EOSINOPHIL # BLD: 0.1 K/UL (ref 0–0.6)
EOSINOPHIL NFR BLD: 2 %
EST. AVERAGE GLUCOSE BLD GHB EST-MCNC: 105.4 MG/DL
GFR SERPLBLD CREATININE-BSD FMLA CKD-EPI: >90 ML/MIN/{1.73_M2}
GLUCOSE SERPL-MCNC: 86 MG/DL (ref 70–99)
HBA1C MFR BLD: 5.3 %
HCT VFR BLD AUTO: 34.8 % (ref 36–48)
HDLC SERPL-MCNC: 70 MG/DL (ref 40–60)
HGB BLD-MCNC: 11.7 G/DL (ref 12–16)
LDLC SERPL CALC-MCNC: 105 MG/DL
LYMPHOCYTES # BLD: 1.4 K/UL (ref 1–5.1)
LYMPHOCYTES NFR BLD: 20 %
MCH RBC QN AUTO: 30.7 PG (ref 26–34)
MCHC RBC AUTO-ENTMCNC: 33.7 G/DL (ref 31–36)
MCV RBC AUTO: 91 FL (ref 80–100)
MONOCYTES # BLD: 0.6 K/UL (ref 0–1.3)
MONOCYTES NFR BLD: 9 %
NEUTROPHILS # BLD: 4.6 K/UL (ref 1.7–7.7)
NEUTROPHILS NFR BLD: 67 %
NEUTS BAND NFR BLD MANUAL: 1 % (ref 0–7)
OVALOCYTES BLD QL SMEAR: ABNORMAL
PLATELET # BLD AUTO: 238 K/UL (ref 135–450)
PLATELET BLD QL SMEAR: ADEQUATE
PMV BLD AUTO: 9.7 FL (ref 5–10.5)
POTASSIUM SERPL-SCNC: 4.3 MMOL/L (ref 3.5–5.1)
PROT SERPL-MCNC: 6.9 G/DL (ref 6.4–8.2)
RBC # BLD AUTO: 3.83 M/UL (ref 4–5.2)
SODIUM SERPL-SCNC: 136 MMOL/L (ref 136–145)
TRIGL SERPL-MCNC: 98 MG/DL (ref 0–150)
TSH SERPL DL<=0.005 MIU/L-ACNC: 2.41 UIU/ML (ref 0.27–4.2)
VLDLC SERPL CALC-MCNC: 20 MG/DL
WBC # BLD AUTO: 6.8 K/UL (ref 4–11)

## 2024-12-01 DIAGNOSIS — R79.9 ABNORMAL BLOOD CHEMISTRY: Primary | ICD-10-CM

## 2025-02-25 DIAGNOSIS — R79.9 ABNORMAL BLOOD CHEMISTRY: ICD-10-CM

## 2025-02-26 DIAGNOSIS — R79.9 ABNORMAL BLOOD CHEMISTRY: Primary | ICD-10-CM

## 2025-02-26 LAB
BASOPHILS # BLD: 0.1 K/UL (ref 0–0.2)
BASOPHILS NFR BLD: 1.2 %
DEPRECATED RDW RBC AUTO: 17.7 % (ref 12.4–15.4)
EOSINOPHIL # BLD: 0 K/UL (ref 0–0.6)
EOSINOPHIL NFR BLD: 0.5 %
HCT VFR BLD AUTO: 34.7 % (ref 36–48)
HGB BLD-MCNC: 11.4 G/DL (ref 12–16)
LYMPHOCYTES # BLD: 1.1 K/UL (ref 1–5.1)
LYMPHOCYTES NFR BLD: 21.3 %
MCH RBC QN AUTO: 30.6 PG (ref 26–34)
MCHC RBC AUTO-ENTMCNC: 32.8 G/DL (ref 31–36)
MCV RBC AUTO: 93.4 FL (ref 80–100)
MONOCYTES # BLD: 0.8 K/UL (ref 0–1.3)
MONOCYTES NFR BLD: 15.4 %
NEUTROPHILS # BLD: 3.1 K/UL (ref 1.7–7.7)
NEUTROPHILS NFR BLD: 61.6 %
PLATELET # BLD AUTO: 229 K/UL (ref 135–450)
PMV BLD AUTO: 9.7 FL (ref 5–10.5)
RBC # BLD AUTO: 3.71 M/UL (ref 4–5.2)
WBC # BLD AUTO: 5 K/UL (ref 4–11)

## 2025-03-04 DIAGNOSIS — R79.9 ABNORMAL BLOOD CHEMISTRY: ICD-10-CM

## 2025-03-05 LAB
FERRITIN SERPL IA-MCNC: 65.6 NG/ML (ref 15–150)
IRON SATN MFR SERPL: 26 % (ref 15–50)
IRON SERPL-MCNC: 78 UG/DL (ref 37–145)
TIBC SERPL-MCNC: 295 UG/DL (ref 260–445)
TRANSFERRIN SERPL-MCNC: 237 MG/DL (ref 200–360)

## 2025-03-17 RX ORDER — ALENDRONATE SODIUM 70 MG/1
TABLET ORAL
Qty: 12 TABLET | Refills: 3 | Status: SHIPPED | OUTPATIENT
Start: 2025-03-17

## 2025-03-17 NOTE — TELEPHONE ENCOUNTER
Requested Prescriptions     Pending Prescriptions Disp Refills    alendronate (FOSAMAX) 70 MG tablet [Pharmacy Med Name: ALENDRONATE SODIUM 70 MG TAB] 12 tablet 3     Sig: TAKE 1 TABLET BY MOUTH ONCE WEEKLY ON AN EMPTY STOMACH BEFORE BREAKFAST. REMAIN UPRIGHT FOR 30 MINUTES AND TAKE WITH 8 OUNCES OF WATER      Last Visit: 11/22/24    Last Labs: 3/4/25    Next Visit: 5/5/25

## 2025-05-05 ENCOUNTER — OFFICE VISIT (OUTPATIENT)
Dept: FAMILY MEDICINE CLINIC | Age: 65
End: 2025-05-05
Payer: MEDICARE

## 2025-05-05 VITALS
HEART RATE: 82 BPM | OXYGEN SATURATION: 98 % | HEIGHT: 66 IN | DIASTOLIC BLOOD PRESSURE: 60 MMHG | TEMPERATURE: 97.6 F | SYSTOLIC BLOOD PRESSURE: 118 MMHG | BODY MASS INDEX: 20.76 KG/M2 | WEIGHT: 129.2 LBS

## 2025-05-05 DIAGNOSIS — Z79.818 CURRENT USE OF ESTROGEN THERAPY: ICD-10-CM

## 2025-05-05 DIAGNOSIS — R05.1 ACUTE COUGH: ICD-10-CM

## 2025-05-05 DIAGNOSIS — K59.00 CONSTIPATION, UNSPECIFIED CONSTIPATION TYPE: ICD-10-CM

## 2025-05-05 DIAGNOSIS — F32.89 OTHER DEPRESSION: ICD-10-CM

## 2025-05-05 DIAGNOSIS — M81.0 AGE-RELATED OSTEOPOROSIS WITHOUT CURRENT PATHOLOGICAL FRACTURE: Primary | ICD-10-CM

## 2025-05-05 PROCEDURE — 99214 OFFICE O/P EST MOD 30 MIN: CPT | Performed by: STUDENT IN AN ORGANIZED HEALTH CARE EDUCATION/TRAINING PROGRAM

## 2025-05-05 PROCEDURE — 1123F ACP DISCUSS/DSCN MKR DOCD: CPT | Performed by: STUDENT IN AN ORGANIZED HEALTH CARE EDUCATION/TRAINING PROGRAM

## 2025-05-05 SDOH — ECONOMIC STABILITY: FOOD INSECURITY: WITHIN THE PAST 12 MONTHS, YOU WORRIED THAT YOUR FOOD WOULD RUN OUT BEFORE YOU GOT MONEY TO BUY MORE.: NEVER TRUE

## 2025-05-05 SDOH — ECONOMIC STABILITY: FOOD INSECURITY: WITHIN THE PAST 12 MONTHS, THE FOOD YOU BOUGHT JUST DIDN'T LAST AND YOU DIDN'T HAVE MONEY TO GET MORE.: NEVER TRUE

## 2025-05-05 ASSESSMENT — PATIENT HEALTH QUESTIONNAIRE - PHQ9
SUM OF ALL RESPONSES TO PHQ QUESTIONS 1-9: 0
SUM OF ALL RESPONSES TO PHQ QUESTIONS 1-9: 0
2. FEELING DOWN, DEPRESSED OR HOPELESS: NOT AT ALL
SUM OF ALL RESPONSES TO PHQ QUESTIONS 1-9: 0
SUM OF ALL RESPONSES TO PHQ QUESTIONS 1-9: 0
1. LITTLE INTEREST OR PLEASURE IN DOING THINGS: NOT AT ALL

## 2025-05-05 NOTE — PROGRESS NOTES
Jessie Romero is a 65 y.o.female who presents with   Chief Complaint   Patient presents with    6 Month Follow-Up     History of Present Illness  The patient presents for a follow-up visit.    She reports no significant changes in her health status. Recently, she experienced an episode of chest congestion, cough, and nasal symptoms, which were managed with a short course of steroids and the use of inhalers. She plans to continue using Symbicort and has discontinued the use of Atrovent nasal spray, currently using ipratropium instead.    She is on estrogen therapy and has an appointment scheduled with Dr. Alicia in 06/2025. She does not perform self-breast exams and typically undergoes mammography annually.    Her constipation is well-managed with daily MiraLAX. She reports no dizziness, lightheadedness, chest pain, shortness of breath, or blood in stool.    She is responding well to Paxil, which effectively controls her symptoms. An iron panel checked earlier in the year was normal, despite a low hemoglobin and hematocrit, which have been consistent over the years.        Review of Systems   All other systems reviewed and are negative.       Past Medical History:   Diagnosis Date    Age-related osteoporosis without current pathological fracture 03/07/2019    11/10/2021 L Spine (L 1-4): BMD= 0.792 g/cm2, T-score= -2.3, Z-score= -0.8.    R Hip: BMD= 0.648 g/cm2, T-score= -2.4, Z-score= -1.4.   R Femoral Neck: BMD= 0.562 g/cm2, T-score= -2.6, Z-score= -1.2. The bone mineral density has decreased by 2% from prior.      Anxiety disorder     with panic attacks, Dr. Scott    Cervical disc disease     MRI 5/09 with disc bulge C5-6    Internal hemorrhoids     Microhematuria 11/27/2013    Normal cysto/CT 2017, and patient reports negative 11/13.     Microhematuria 11/27/2013    Cysto negative again 2017, Dr. Maza.  Negative 11/13.    Osteoarthritis     Osteopenia of multiple sites 03/07/2019